# Patient Record
Sex: MALE | Race: WHITE | Employment: FULL TIME | URBAN - METROPOLITAN AREA
[De-identification: names, ages, dates, MRNs, and addresses within clinical notes are randomized per-mention and may not be internally consistent; named-entity substitution may affect disease eponyms.]

---

## 2017-04-05 RX ORDER — ASPIRIN 81 MG/1
81 TABLET ORAL DAILY
Status: ON HOLD | COMMUNITY
End: 2018-02-02

## 2017-04-06 ENCOUNTER — ANESTHESIA (OUTPATIENT)
Dept: ENDOSCOPY | Age: 72
End: 2017-04-06
Payer: COMMERCIAL

## 2017-04-06 ENCOUNTER — ANESTHESIA EVENT (OUTPATIENT)
Dept: ENDOSCOPY | Age: 72
End: 2017-04-06
Payer: COMMERCIAL

## 2017-04-06 ENCOUNTER — HOSPITAL ENCOUNTER (OUTPATIENT)
Age: 72
Setting detail: OUTPATIENT SURGERY
Discharge: HOME | End: 2017-04-06
Attending: SPECIALIST | Admitting: SPECIALIST
Payer: COMMERCIAL

## 2017-04-06 VITALS
SYSTOLIC BLOOD PRESSURE: 144 MMHG | RESPIRATION RATE: 19 BRPM | BODY MASS INDEX: 24.08 KG/M2 | DIASTOLIC BLOOD PRESSURE: 79 MMHG | OXYGEN SATURATION: 99 % | WEIGHT: 172 LBS | TEMPERATURE: 97.6 F | HEIGHT: 71 IN | HEART RATE: 63 BPM

## 2017-04-06 PROCEDURE — 76060000031 HC ANESTHESIA FIRST 0.5 HR: Performed by: SPECIALIST

## 2017-04-06 PROCEDURE — 74011250636 HC RX REV CODE- 250/636: Performed by: SPECIALIST

## 2017-04-06 PROCEDURE — 74011000250 HC RX REV CODE- 250

## 2017-04-06 PROCEDURE — 88305 TISSUE EXAM BY PATHOLOGIST: CPT | Performed by: SPECIALIST

## 2017-04-06 PROCEDURE — 77030019988 HC FCPS ENDOSC DISP BSC -B: Performed by: SPECIALIST

## 2017-04-06 PROCEDURE — 74011250636 HC RX REV CODE- 250/636

## 2017-04-06 PROCEDURE — 76040000019: Performed by: SPECIALIST

## 2017-04-06 RX ORDER — ATROPINE SULFATE 0.1 MG/ML
0.5 INJECTION INTRAVENOUS
Status: DISCONTINUED | OUTPATIENT
Start: 2017-04-06 | End: 2017-04-06 | Stop reason: HOSPADM

## 2017-04-06 RX ORDER — LIDOCAINE HYDROCHLORIDE 20 MG/ML
INJECTION, SOLUTION EPIDURAL; INFILTRATION; INTRACAUDAL; PERINEURAL AS NEEDED
Status: DISCONTINUED | OUTPATIENT
Start: 2017-04-06 | End: 2017-04-06 | Stop reason: HOSPADM

## 2017-04-06 RX ORDER — NALOXONE HYDROCHLORIDE 0.4 MG/ML
0.4 INJECTION, SOLUTION INTRAMUSCULAR; INTRAVENOUS; SUBCUTANEOUS
Status: DISCONTINUED | OUTPATIENT
Start: 2017-04-06 | End: 2017-04-06 | Stop reason: HOSPADM

## 2017-04-06 RX ORDER — SODIUM CHLORIDE 0.9 % (FLUSH) 0.9 %
5-10 SYRINGE (ML) INJECTION EVERY 8 HOURS
Status: CANCELLED | OUTPATIENT
Start: 2017-04-06 | End: 2017-04-06

## 2017-04-06 RX ORDER — EPINEPHRINE 0.1 MG/ML
1 INJECTION INTRACARDIAC; INTRAVENOUS
Status: DISCONTINUED | OUTPATIENT
Start: 2017-04-06 | End: 2017-04-06 | Stop reason: HOSPADM

## 2017-04-06 RX ORDER — SODIUM CHLORIDE 9 MG/ML
75 INJECTION, SOLUTION INTRAVENOUS CONTINUOUS
Status: DISCONTINUED | OUTPATIENT
Start: 2017-04-06 | End: 2017-04-06 | Stop reason: HOSPADM

## 2017-04-06 RX ORDER — MIDAZOLAM HYDROCHLORIDE 1 MG/ML
.25-5 INJECTION, SOLUTION INTRAMUSCULAR; INTRAVENOUS
Status: DISCONTINUED | OUTPATIENT
Start: 2017-04-06 | End: 2017-04-06 | Stop reason: HOSPADM

## 2017-04-06 RX ORDER — FLUMAZENIL 0.1 MG/ML
0.2 INJECTION INTRAVENOUS
Status: DISCONTINUED | OUTPATIENT
Start: 2017-04-06 | End: 2017-04-06 | Stop reason: HOSPADM

## 2017-04-06 RX ORDER — GLYCOPYRROLATE 0.2 MG/ML
INJECTION INTRAMUSCULAR; INTRAVENOUS AS NEEDED
Status: DISCONTINUED | OUTPATIENT
Start: 2017-04-06 | End: 2017-04-06 | Stop reason: HOSPADM

## 2017-04-06 RX ORDER — DEXTROMETHORPHAN/PSEUDOEPHED 2.5-7.5/.8
1.2 DROPS ORAL
Status: DISCONTINUED | OUTPATIENT
Start: 2017-04-06 | End: 2017-04-06 | Stop reason: HOSPADM

## 2017-04-06 RX ORDER — PROPOFOL 10 MG/ML
INJECTION, EMULSION INTRAVENOUS AS NEEDED
Status: DISCONTINUED | OUTPATIENT
Start: 2017-04-06 | End: 2017-04-06 | Stop reason: HOSPADM

## 2017-04-06 RX ORDER — SODIUM CHLORIDE 0.9 % (FLUSH) 0.9 %
5-10 SYRINGE (ML) INJECTION AS NEEDED
Status: CANCELLED | OUTPATIENT
Start: 2017-04-06 | End: 2017-04-06

## 2017-04-06 RX ORDER — SODIUM CHLORIDE 0.9 % (FLUSH) 0.9 %
5-10 SYRINGE (ML) INJECTION AS NEEDED
Status: DISCONTINUED | OUTPATIENT
Start: 2017-04-06 | End: 2017-04-06 | Stop reason: HOSPADM

## 2017-04-06 RX ORDER — SODIUM CHLORIDE 0.9 % (FLUSH) 0.9 %
5-10 SYRINGE (ML) INJECTION EVERY 8 HOURS
Status: DISCONTINUED | OUTPATIENT
Start: 2017-04-06 | End: 2017-04-06 | Stop reason: HOSPADM

## 2017-04-06 RX ADMIN — SODIUM CHLORIDE 75 ML/HR: 900 INJECTION, SOLUTION INTRAVENOUS at 08:13

## 2017-04-06 RX ADMIN — LIDOCAINE HYDROCHLORIDE 80 MG: 20 INJECTION, SOLUTION EPIDURAL; INFILTRATION; INTRACAUDAL; PERINEURAL at 08:37

## 2017-04-06 RX ADMIN — PROPOFOL 240 MG: 10 INJECTION, EMULSION INTRAVENOUS at 08:51

## 2017-04-06 RX ADMIN — GLYCOPYRROLATE 0.2 MG: 0.2 INJECTION INTRAMUSCULAR; INTRAVENOUS at 08:37

## 2017-04-06 NOTE — ROUTINE PROCESS
Joshua Appiah   1945  [id number]    Situation:  Verbal report received from: Sapphire Engle RN  Procedure: Procedure(s):  ESOPHAGOGASTRODUODENOSCOPY (EGD)  ESOPHAGOGASTRODUODENAL (EGD) BIOPSY    Background:    Preoperative diagnosis: [facility]  Postoperative diagnosis: inlet patch, barretts esophagus, hiatal hernia    :  Dr. Eladio Armstrong  Assistant(s): Endoscopy RN-1: Mendel Brown, RN  Endoscopy RN-2: Berneda Hatchet, RN    Specimens:   ID Type Source Tests Collected by Time Destination   1 : 39 cm distal esophagus biopsy Preservative Esophagus, Distal  Mark Anthony Lozano MD 4/6/2017 0845 Pathology   2 : 37 cm distal esophagus biopsy Preservative Esophagus, Distal  Mark Anthony Lozano MD 4/6/2017 5259 Pathology   3 : 35 cm distal esophagus biopsy Preservative Esophagus, Alma Lozano MD 4/6/2017 0848 Pathology   4 : 33 cm distal esophagus biopsy Preservative Esophagus, Alma Lozano MD 4/6/2017 3095 Pathology     H. Pylori  no    Assessment:  Intra-procedure medications   Anesthesia gave intra-procedure sedation and medications, see anesthesia flow sheet yes    Intravenous fluids: NS@ KVO     Vital signs stable     Abdominal assessment: round and soft     Recommendation:  Discharge patient per MD order.   Family   Permission to share finding with family or friend yes

## 2017-04-06 NOTE — ANESTHESIA PREPROCEDURE EVALUATION
Anesthetic History   No history of anesthetic complications            Review of Systems / Medical History  Patient summary reviewed, nursing notes reviewed and pertinent labs reviewed    Pulmonary  Within defined limits                 Neuro/Psych   Within defined limits           Cardiovascular                  Exercise tolerance: >4 METS  Comments:   EKG: SB, rate 59    Active, denies any chest pain or hrt probs   GI/Hepatic/Renal     GERD          Comments: Swan's , s/p HALO procedure Endo/Other  Within defined limits           Other Findings   Comments: Lyme's dis          Physical Exam    Airway  Mallampati: III  TM Distance: 4 - 6 cm  Neck ROM: normal range of motion   Mouth opening: Normal     Cardiovascular    Rhythm: regular  Rate: normal         Dental    Dentition: Lower partial plate  Comments: Missing some teeth, left lower partial at home, denies any loose teeth   Pulmonary  Breath sounds clear to auscultation               Abdominal  GI exam deferred       Other Findings            Anesthetic Plan    ASA: 2  Anesthesia type: total IV anesthesia          Induction: Intravenous  Anesthetic plan and risks discussed with: Patient

## 2017-04-06 NOTE — DISCHARGE INSTRUCTIONS
Diana Toledo   [id number]  1945              Procedure  Discharge Instructions:      Discomfort:  Redness at IV site- apply warm compress to area; if redness or soreness persist- contact your physician  There may be a slight amount of blood passed from the rectum  Gaseous discomfort- walking, belching will help relieve any discomfort  You may not operate a vehicle for 12 hours  You may not engage in an occupation involving machinery or appliances for rest of today  You may not drink alcoholic beverages for at least 12 hours  Avoid making any critical decisions for at least 24 hour  DIET:   You may resume your normal diet today. You should not overeat or \"feast\" today as your abdomen may become distended or uncomfortable. MEDICATIONS:   I reconciled this list from the list you gave us when you came today for the procedure. Please clarify with me, your primary care physician and the nurse who is discharging you if we have any discrepancies. Aspirin and or non-steroidal medication (Ibuprofen, Motrin, naproxen, etc.) is ok in limited quantities. ACTIVITY:  You may resume your normal daily activities it is recommended that you spend the remainder of the day resting -  avoid any strenuous activity. CALL M.D. ANY SIGN OF:  Increasing pain, nausea, vomiting  Abdominal distension (swelling)  New increased bleeding (oral or rectal)  Fever (chills)  Pain in chest area  Bloody discharge from nose or mouth  Shortness of breath          Follow-up Instructions:   Call Dr. Fran Reese for the results of  biopsy in approximately one week  Telephone #  [phone]  Follow up visit as needed. I will send you back to Dr. Nora Mae for more ablation with your permission. Braulio Wilson MD  8:56 AM  4/6/2017   [employer] Activation    Thank you for requesting access to [employer]. Please follow the instructions below to securely access and download your online medical record.  [employer] allows you to send messages to your doctor, view your test results, renew your prescriptions, schedule appointments, and more. How Do I Sign Up? 1. In your internet browser, go to [email or web address]  2. Click on the First Time User? Click Here link in the Sign In box. You will be redirect to the New Member Sign Up page. 3. Enter your Oktopost Access Code exactly as it appears below. You will not need to use this code after youve completed the sign-up process. If you do not sign up before the expiration date, you must request a new code. MyChart Access Code: Activation code not generated  Current Oktopost Status: Active (This is the date your Oktopost access code will )    4. Enter the last four digits of your Social Security Number (xxxx) and Date of Birth (mm/dd/yyyy) as indicated and click Submit. You will be taken to the next sign-up page. 5. Create a Oktopost ID. This will be your Oktopost login ID and cannot be changed, so think of one that is secure and easy to remember. 6. Create a Oktopost password. You can change your password at any time. 7. Enter your Password Reset Question and Answer. This can be used at a later time if you forget your password. 8. Enter your e-mail address. You will receive e-mail notification when new information is available in [address] 9. Click Sign Up. You can now view and download portions of your medical record. 10. Click the Download Summary menu link to download a portable copy of your medical information. Additional Information    If you have questions, please visit the Frequently Asked Questions section of the Oktopost website at [email or web address] Remember, Oktopost is NOT to be used for urgent needs. For medical emergencies, dial 911.

## 2017-04-06 NOTE — PERIOP NOTES
Anesthesia reports 240mg Propofol, 80mg Lidocaine and 650mL NS, 0.2 mg robinul given during procedure. Received report from anesthesia staff on vital signs and status of patient.

## 2017-04-06 NOTE — PROCEDURES
Esophagogastroduodenoscopy    Indications:  History of Swan's esophagus with low grade dysplasia, s/p ablation    Medications:  See anesthesia form    Post procedure diagnosis:  inlet patch, barretts esophagus, hiatal hernia    Description of Procedure:    Prior to the procedure its objectives, risks, consequences and alternatives were discussed with the patient who then elected to proceed. The Olympus video endoscope was inserted under direct vision into the mouth and then into the esophagus. An inlet patch was present just below the crico.  There was normal esophageal mucosa to 33 cm where two wide tongues of salmon colored mucosa began. These tongues extended distally to the gastric folds at 39 cm. There was no circumferential Swan's. Narrow band imaging was used and photos were taken. There was a two cm hiatal hernia with the diaphragm at 41 cm. There were no diagnostic abnormalities of the body, fundus, antrum, cardia and incisura of the stomach. This included direct and retroflexion examination. The first and second portion of the duodenum appeared normal.  I took biopsies of the Swan's at 2 cm intervals. Complications: There were no apparent complications and the patient tolerated the procedure well. Estimated Blood Loss:  none  Specimens Removed:  Esophagus at 39 cm  Esophagus at 37 cm  Esophagus at 35 cm  Esophagus at 33 cm    Impressions:  Tongues of Swan's esophagus from 33 to 39 cm.     2 cm hiatal hernia  Inlet patch      Signed By: Jaskaran Barlow MD                        April 6, 2017     8:52 AM

## 2017-04-06 NOTE — IP AVS SNAPSHOT
[address] [place] [address] 
[phone] Patient: Linda Means 
MRN: [id number] YEB:4/11/2886 You are allergic to the following Allergen Reactions Adhesive Tape-Silicones Other (comments) Some bandaids \"breaks skin out\" per wife. Recent Documentation Height Weight BMI Smoking Status 1.791 m 78 kg 24.33 kg/m2 Never Smoker Emergency Contacts Name Discharge Info Relation Home Work Mobile Marysol Strickland  Spouse [3]   [phone] About your hospitalization You were admitted on:  April 6, 2017 You last received care in the:  [facility] You were discharged on:  April 6, 2017 Unit phone number:  [phone] Why you were hospitalized Your primary diagnosis was:  Not on File Your diagnoses also included:  Swan's Esophagus Providers Seen During Your Hospitalizations Provider Role Specialty Primary office phone Soumya Reddy MD Attending Provider Gastroenterology [phone] Your Primary Care Physician (PCP) Primary Care Physician Office Phone Office Fax Kareem Burrows [phone] Follow-up Information Follow up With Details Comments Contact Info [place]omi [place]   [address] [address] 
[phone] Current Discharge Medication List  
  
CONTINUE these medications which have NOT CHANGED Dose & Instructions Dispensing Information Comments Morning Noon Evening Bedtime  
 aspirin delayed-release 81 mg tablet Your last dose was: Your next dose is:    
   
   
 Dose:  81 mg Take 81 mg by mouth daily. Refills:  0  
     
   
   
   
  
 omeprazole 20 mg capsule Commonly known as:  PRILOSEC Your last dose was: Your next dose is:    
   
   
 Dose:  40 mg Take 2 Caps by mouth two (2) times a day. Before meals Quantity:  60 Cap Refills:  11 Discharge Instructions John Campos  
[id number] 
1945 Procedure  Discharge Instructions:   
 
Discomfort: 
Redness at IV site- apply warm compress to area; if redness or soreness persist- contact your physician There may be a slight amount of blood passed from the rectum Gaseous discomfort- walking, belching will help relieve any discomfort You may not operate a vehicle for 12 hours You may not engage in an occupation involving machinery or appliances for rest of today You may not drink alcoholic beverages for at least 12 hours Avoid making any critical decisions for at least 24 hour DIET: 
 You may resume your normal diet today. You should not overeat or \"feast\" today as your abdomen may become distended or uncomfortable. MEDICATIONS: 
 I reconciled this list from the list you gave us when you came today for the procedure. Please clarify with me, your primary care physician and the nurse who is discharging you if we have any discrepancies. Aspirin and or non-steroidal medication (Ibuprofen, Motrin, naproxen, etc.) is ok in limited quantities. ACTIVITY: 
You may resume your normal daily activities it is recommended that you spend the remainder of the day resting -  avoid any strenuous activity. CALL M.D. ANY SIGN OF: Increasing pain, nausea, vomiting Abdominal distension (swelling) New increased bleeding (oral or rectal) Fever (chills) Pain in chest area Bloody discharge from nose or mouth Shortness of breath Follow-up Instructions: 
 Call Dr. Grover Burgos for the results of  biopsy in approximately one week Telephone #  [phone] Follow up visit as needed. I will send you back to Dr. Natasha Pena for more ablation with your permission. Yash Hernandez MD 
8:56 AM 
4/6/2017 [email or web address] Activation Thank you for requesting access to [employer].  Please follow the instructions below to securely access and download your online medical record. [employer] corporation allows you to send messages to your doctor, view your test results, renew your prescriptions, schedule appointments, and more. How Do I Sign Up? 1. In your internet browser, go to [email or web address] 
2. Click on the First Time User? Click Here link in the Sign In box. You will be redirect to the New Member Sign Up page. 3. Enter your [employer] corporation Access Code exactly as it appears below. You will not need to use this code after youve completed the sign-up process. If you do not sign up before the expiration date, you must request a new code. [employer] Access Code: Activation code not generated Current [employer] Status: Active (This is the date your [employer] access code will ) 4. Enter the last four digits of your Social Security Number (xxxx) and Date of Birth (mm/dd/yyyy) as indicated and click Submit. You will be taken to the next sign-up page. 5. Create a [employer] ID. This will be your [employer] login ID and cannot be changed, so think of one that is secure and easy to remember. 6. Create a [employer] password. You can change your password at any time. 7. Enter your Password Reset Question and Answer. This can be used at a later time if you forget your password. 8. Enter your e-mail address. You will receive e-mail notification when new information is available in [address] 9. Click Sign Up. You can now view and download portions of your medical record. 10. Click the Download Summary menu link to download a portable copy of your medical information. Additional Information If you have questions, please visit the Frequently Asked Questions section of the [employer] corporation website at [email or web address] Remember, [employer] corporation is NOT to be used for urgent needs. For medical emergencies, dial 911. Discharge Orders None Introducing [facility] SERVICES! Dear Agustina Ravi: Thank you for requesting a Sweetgreen account. Our records indicate that you already have an active Sweetgreen account. You can access your account anytime at [email or web address] Did you know that you can access your hospital and ER discharge instructions at any time in Sweetgreen? You can also review all of your test results from your hospital stay or ER visit. Additional Information If you have questions, please visit the Frequently Asked Questions section of the Sweetgreen website at [email or web address]/. Remember, Sweetgreen is NOT to be used for urgent needs. For medical emergencies, dial 911. Now available from your iPhone and Android! General Information Please provide this summary of care documentation to your next provider. Patient Signature:  ____________________________________________________________ Date:  ____________________________________________________________  
  
Michell Wheatland Provider Signature:  ____________________________________________________________ Date:  ____________________________________________________________

## 2017-04-06 NOTE — ANESTHESIA POSTPROCEDURE EVALUATION
Post-Anesthesia Evaluation and Assessment    Patient: Tigre Washington MRN: [id number]  SSN: [id number]    YOB: 1945  Age: 66 y.o. Sex: male       Cardiovascular Function/Vital Signs  Visit Vitals    /79    Pulse 63    Temp 36.4 °C (97.6 °F)    Resp 19    Ht 5' 10.5\" (1.791 m)    Wt 78 kg (172 lb)    SpO2 99%    BMI 24.33 kg/m2       Patient is status post total IV anesthesia anesthesia for Procedure(s):  ESOPHAGOGASTRODUODENOSCOPY (EGD)  ESOPHAGOGASTRODUODENAL (EGD) BIOPSY. Nausea/Vomiting: None    Postoperative hydration reviewed and adequate. Pain:  Pain Scale 1: Numeric (0 - 10) (04/06/17 5888)  Pain Intensity 1: 0 (04/06/17 2552)   Managed    Neurological Status: At baseline    Mental Status and Level of Consciousness: Arousable    Pulmonary Status:   O2 Device: Room air (04/06/17 9899)   Adequate oxygenation and airway patent    Complications related to anesthesia: None    Post-anesthesia assessment completed.  No concerns    Signed By: Nba Jeffery DO     April 6, 2017

## 2017-04-06 NOTE — H&P
Pre-endoscopy H and P    The patient was seen and examined in the endoscopy suite. The airway was assessed and docuemented. The problem list, past medical history, and medications were reviewed. Patient Active Problem List   Diagnosis Code    Swan's esophagus K22.70     Social History     Social History    Marital status:      Spouse name: N/A    Number of children: N/A    Years of education: N/A     Occupational History    Not on file. Social History Main Topics    Smoking status: Never Smoker    Smokeless tobacco: Never Used    Alcohol use No    Drug use: No    Sexual activity: Not on file     Other Topics Concern    Not on file     Social History Narrative     Past Medical History:   Diagnosis Date    Swan's esophagus     Gastrointestinal disorder     GERD (gastroesophageal reflux disease)     Ill-defined condition     lyme's disease     The patient has a family history of na    Prior to Admission Medications   Prescriptions Last Dose Informant Patient Reported? Taking?   aspirin delayed-release 81 mg tablet Not Taking at Unknown time  Yes No   Sig: Take 81 mg by mouth daily. omeprazole (PRILOSEC) 20 mg capsule 3/6/2017 at Unknown time  No Yes   Sig: Take 2 Caps by mouth two (2) times a day. Before meals      Facility-Administered Medications: None           The review of systems is:  negative for shortness of breath or chest pain      The heart, lungs, and mental status were satisfactory for the administration of anesthesia sedation and for the procedure. I discussed with the patient the objectives, risks, consequences and alternatives to the procedure.       Yash Hernandez MD  4/6/2017  8:36 AM

## 2017-07-24 ENCOUNTER — ANESTHESIA EVENT (OUTPATIENT)
Dept: ENDOSCOPY | Age: 72
End: 2017-07-24
Payer: MEDICARE

## 2017-07-24 ENCOUNTER — HOSPITAL ENCOUNTER (OUTPATIENT)
Age: 72
Setting detail: OUTPATIENT SURGERY
Discharge: HOME | End: 2017-07-24
Attending: INTERNAL MEDICINE | Admitting: INTERNAL MEDICINE
Payer: MEDICARE

## 2017-07-24 ENCOUNTER — ANESTHESIA (OUTPATIENT)
Dept: ENDOSCOPY | Age: 72
End: 2017-07-24
Payer: MEDICARE

## 2017-07-24 VITALS
DIASTOLIC BLOOD PRESSURE: 52 MMHG | HEART RATE: 56 BPM | SYSTOLIC BLOOD PRESSURE: 162 MMHG | OXYGEN SATURATION: 97 % | RESPIRATION RATE: 8 BRPM

## 2017-07-24 PROCEDURE — 76060000031 HC ANESTHESIA FIRST 0.5 HR: Performed by: INTERNAL MEDICINE

## 2017-07-24 PROCEDURE — 74011250636 HC RX REV CODE- 250/636

## 2017-07-24 PROCEDURE — 77030025937 HC CAP SEAL HOLO BRMX -B: Performed by: INTERNAL MEDICINE

## 2017-07-24 PROCEDURE — 76040000019: Performed by: INTERNAL MEDICINE

## 2017-07-24 PROCEDURE — 74011250636 HC RX REV CODE- 250/636: Performed by: INTERNAL MEDICINE

## 2017-07-24 PROCEDURE — 77030036721 HC CATH BLN ABLAT ESOPH BARRX COVD -H1: Performed by: INTERNAL MEDICINE

## 2017-07-24 PROCEDURE — 74011000250 HC RX REV CODE- 250

## 2017-07-24 RX ORDER — FLUMAZENIL 0.1 MG/ML
0.2 INJECTION INTRAVENOUS
Status: CANCELLED | OUTPATIENT
Start: 2017-07-24 | End: 2017-07-24

## 2017-07-24 RX ORDER — NALOXONE HYDROCHLORIDE 0.4 MG/ML
0.4 INJECTION, SOLUTION INTRAMUSCULAR; INTRAVENOUS; SUBCUTANEOUS
Status: CANCELLED | OUTPATIENT
Start: 2017-07-24 | End: 2017-07-24

## 2017-07-24 RX ORDER — FENTANYL CITRATE 50 UG/ML
100 INJECTION, SOLUTION INTRAMUSCULAR; INTRAVENOUS
Status: CANCELLED | OUTPATIENT
Start: 2017-07-24

## 2017-07-24 RX ORDER — FINASTERIDE 5 MG/1
5 TABLET, FILM COATED ORAL DAILY
Status: ON HOLD | COMMUNITY
End: 2018-02-02

## 2017-07-24 RX ORDER — SODIUM CHLORIDE 9 MG/ML
INJECTION, SOLUTION INTRAVENOUS
Status: DISCONTINUED | OUTPATIENT
Start: 2017-07-24 | End: 2017-07-24 | Stop reason: HOSPADM

## 2017-07-24 RX ORDER — ATROPINE SULFATE 0.1 MG/ML
0.5 INJECTION INTRAVENOUS
Status: CANCELLED | OUTPATIENT
Start: 2017-07-24 | End: 2017-07-24

## 2017-07-24 RX ORDER — EPINEPHRINE 0.1 MG/ML
1 INJECTION INTRACARDIAC; INTRAVENOUS
Status: CANCELLED | OUTPATIENT
Start: 2017-07-24 | End: 2017-07-24

## 2017-07-24 RX ORDER — MIDAZOLAM HYDROCHLORIDE 1 MG/ML
.25-1 INJECTION, SOLUTION INTRAMUSCULAR; INTRAVENOUS
Status: CANCELLED | OUTPATIENT
Start: 2017-07-24 | End: 2017-07-24

## 2017-07-24 RX ORDER — LIDOCAINE HYDROCHLORIDE 20 MG/ML
INJECTION, SOLUTION EPIDURAL; INFILTRATION; INTRACAUDAL; PERINEURAL AS NEEDED
Status: DISCONTINUED | OUTPATIENT
Start: 2017-07-24 | End: 2017-07-24 | Stop reason: HOSPADM

## 2017-07-24 RX ORDER — SODIUM CHLORIDE 0.9 % (FLUSH) 0.9 %
5-10 SYRINGE (ML) INJECTION AS NEEDED
Status: CANCELLED | OUTPATIENT
Start: 2017-07-24 | End: 2017-07-24

## 2017-07-24 RX ORDER — SODIUM CHLORIDE 9 MG/ML
25 INJECTION, SOLUTION INTRAVENOUS CONTINUOUS
Status: DISCONTINUED | OUTPATIENT
Start: 2017-07-24 | End: 2017-07-24 | Stop reason: HOSPADM

## 2017-07-24 RX ORDER — SODIUM CHLORIDE 0.9 % (FLUSH) 0.9 %
5-10 SYRINGE (ML) INJECTION EVERY 8 HOURS
Status: CANCELLED | OUTPATIENT
Start: 2017-07-24 | End: 2017-07-24

## 2017-07-24 RX ORDER — SODIUM CHLORIDE 9 MG/ML
50 INJECTION, SOLUTION INTRAVENOUS CONTINUOUS
Status: CANCELLED | OUTPATIENT
Start: 2017-07-24 | End: 2017-07-24

## 2017-07-24 RX ORDER — PROPOFOL 10 MG/ML
INJECTION, EMULSION INTRAVENOUS AS NEEDED
Status: DISCONTINUED | OUTPATIENT
Start: 2017-07-24 | End: 2017-07-24 | Stop reason: HOSPADM

## 2017-07-24 RX ORDER — DEXTROMETHORPHAN/PSEUDOEPHED 2.5-7.5/.8
1.2 DROPS ORAL
Status: CANCELLED | OUTPATIENT
Start: 2017-07-24

## 2017-07-24 RX ADMIN — PROPOFOL 30 MG: 10 INJECTION, EMULSION INTRAVENOUS at 12:53

## 2017-07-24 RX ADMIN — LIDOCAINE HYDROCHLORIDE 20 MG: 20 INJECTION, SOLUTION EPIDURAL; INFILTRATION; INTRACAUDAL; PERINEURAL at 12:33

## 2017-07-24 RX ADMIN — PROPOFOL 20 MG: 10 INJECTION, EMULSION INTRAVENOUS at 13:12

## 2017-07-24 RX ADMIN — LIDOCAINE HYDROCHLORIDE 20 MG: 20 INJECTION, SOLUTION EPIDURAL; INFILTRATION; INTRACAUDAL; PERINEURAL at 12:38

## 2017-07-24 RX ADMIN — LIDOCAINE HYDROCHLORIDE 20 MG: 20 INJECTION, SOLUTION EPIDURAL; INFILTRATION; INTRACAUDAL; PERINEURAL at 12:31

## 2017-07-24 RX ADMIN — SODIUM CHLORIDE: 9 INJECTION, SOLUTION INTRAVENOUS at 12:08

## 2017-07-24 RX ADMIN — SODIUM CHLORIDE 25 ML/HR: 900 INJECTION, SOLUTION INTRAVENOUS at 11:41

## 2017-07-24 RX ADMIN — PROPOFOL 20 MG: 10 INJECTION, EMULSION INTRAVENOUS at 12:52

## 2017-07-24 RX ADMIN — LIDOCAINE HYDROCHLORIDE 20 MG: 20 INJECTION, SOLUTION EPIDURAL; INFILTRATION; INTRACAUDAL; PERINEURAL at 12:37

## 2017-07-24 RX ADMIN — PROPOFOL 30 MG: 10 INJECTION, EMULSION INTRAVENOUS at 13:15

## 2017-07-24 RX ADMIN — PROPOFOL 10 MG: 10 INJECTION, EMULSION INTRAVENOUS at 13:08

## 2017-07-24 RX ADMIN — PROPOFOL 100 MG: 10 INJECTION, EMULSION INTRAVENOUS at 12:51

## 2017-07-24 RX ADMIN — PROPOFOL 20 MG: 10 INJECTION, EMULSION INTRAVENOUS at 13:10

## 2017-07-24 RX ADMIN — LIDOCAINE HYDROCHLORIDE 20 MG: 20 INJECTION, SOLUTION EPIDURAL; INFILTRATION; INTRACAUDAL; PERINEURAL at 12:35

## 2017-07-24 RX ADMIN — PROPOFOL 20 MG: 10 INJECTION, EMULSION INTRAVENOUS at 12:58

## 2017-07-24 RX ADMIN — PROPOFOL 20 MG: 10 INJECTION, EMULSION INTRAVENOUS at 13:04

## 2017-07-24 RX ADMIN — PROPOFOL 20 MG: 10 INJECTION, EMULSION INTRAVENOUS at 13:01

## 2017-07-24 RX ADMIN — PROPOFOL 20 MG: 10 INJECTION, EMULSION INTRAVENOUS at 13:06

## 2017-07-24 RX ADMIN — PROPOFOL 20 MG: 10 INJECTION, EMULSION INTRAVENOUS at 13:03

## 2017-07-24 RX ADMIN — PROPOFOL 20 MG: 10 INJECTION, EMULSION INTRAVENOUS at 12:56

## 2017-07-24 NOTE — ROUTINE PROCESS
Natalie Husbands   1945  [id number]    Situation:  Verbal report received from: Padma Pleitez  Procedure: Procedure(s):  EGD, HALO ABLATION  ESOPHAGOGASTRODUODENOSCOPY (EGD)    Background:    Preoperative diagnosis: BARRETTS  Postoperative diagnosis: Long Segment Swan's: 27cm-38cm, 6 Burns @ 10J  Hiatal Hernia    :  Dr. Myles Gee  Assistant(s): Endoscopy Technician-1: Mark Noble  Endoscopy RN-1: Vida Martel RN    Specimens: * No specimens in log *  H. Pylori  no    Assessment:  Intra-procedure medications   Anesthesia gave intra-procedure sedation and medications, see anesthesia flow sheet yes    Intravenous fluids: NS@ KVO     Vital signs stable yes    Abdominal assessment: round and soft  yes    Recommendation:  Discharge patient per MD order yes .   Return to floor na   Family or Friend fam  Permission to share finding with family or friend yes

## 2017-07-24 NOTE — H&P
118 [facility].  217 [facility] 140 [facility], [address]  [phone]                                History and Physical     NAME: Collette Bair Sr   :  1945   MRN:  [id number]     HPI:  The patient was seen and examined. Past Surgical History:   Procedure Laterality Date    HX HERNIA REPAIR  \"when I was 13\"     Past Medical History:   Diagnosis Date    Swan's esophagus     Gastrointestinal disorder     GERD (gastroesophageal reflux disease)     Ill-defined condition     lyme's disease     Social History   Substance Use Topics    Smoking status: Never Smoker    Smokeless tobacco: Never Used    Alcohol use No     Allergies   Allergen Reactions    Adhesive Tape-Silicones Other (comments)     Some bandaids \"breaks skin out\" per wife. Family History   Problem Relation Age of Onset    Cancer Father      leukemia     Current Facility-Administered Medications   Medication Dose Route Frequency    0.9% sodium chloride infusion  25 mL/hr IntraVENous CONTINUOUS     Facility-Administered Medications Ordered in Other Encounters   Medication Dose Route Frequency    0.9% sodium chloride infusion   IntraVENous CONTINUOUS    lidocaine (PF) (XYLOCAINE) 20 mg/mL (2 %) injection   IntraVENous PRN         PHYSICAL EXAM:  General: WD, WN. Alert, cooperative, no acute distress    HEENT: NC, Atraumatic. PERRLA, EOMI. Anicteric sclerae. Lungs:  CTA Bilaterally. No Wheezing/Rhonchi/Rales. Heart:  Regular  rhythm,  No murmur, No Rubs, No Gallops  Abdomen: Soft, Non distended, Non tender.  +Bowel sounds, no HSM  Extremities: No c/c/e  Neurologic:  CN 2-12 gi, Alert and oriented X 3. No acute neurological distress   Psych:   Good insight. Not anxious nor agitated. The heart, lungs and mental status were satisfactory for the administration of MAC sedation and for the procedure.       Mallampati score: 3       Assessment:   · Swan's esophagus    Plan:   · Endoscopic procedure  · MAC sedation   ·

## 2017-07-24 NOTE — PROCEDURES
118 [facility].  217 [facility] 140 [address], [address]   [phone]                            NAME:  Romelia Mims Sr   :   1945   MRN:   [id number]     Date/Time:  2017 1:20 PM    Endoscopy with Circumferential Ablation of Esophageal Tissue Procedure Note    :  Jolene Sim MD    Referring Provider:  Sergey Galvan MD    Anethesia/Sedation:  MAC anesthesia    Baseline Patient History:  · Length of IM: 11 cm  · Presence of Dysplasia YES  · GERD Symptoms NO  · Anti-Secretory Therapy Yes  Pre-Op Diagnosis: Swan's esophagus, with low grade dysplasia  Post-Op Diagnosis: Swan's esophagus, hiatal hernia  Summary of Findings:   Esophagus: [place] colored islands were seen starting at 27 cm and extended to 38 cm (top of gastric folds). No nodule, ulceration or granularity was seen. NBI was used. Medium sliding hiatal hernia was seen  Stomach: Normal  Duodenum: Normal    Procedure Description:  The patient was positioned in the left lateral decubitus position and vital sign monitoring equipment connected in the usual manner. Intravenous sedation was administered. (Esophagoscopy or EGD) was performed with identification of the top of the intestinal metaplasia and the top of the gastric folds. The distance from the bite block to each of these anatomic landmarks was recorded, and the total length of intestinal metaplasia calculated from these measurements. These landmarks were located as follows:    Top of intestinal metaplasia: 27 cm  Top of the gastric folds: 38 cm    NBI was used. The Swan's esophagus tissue was irrigated with water. A guidewire was placed and the endoscope removed. A Barrx 360 Express RFA Balloon Catheter was introduced over the guidewire. The endoscope was introduced in a [id number] manner with the Balloon Catheter.  The balloon electrode was positioned under direct visualization so that the proximal edge of the electrode was slightly above the top of the intestinal metaplasia. The balloon was automatically inflated and energy applied at 230 W and 10 J/cm2. The displayed inner diameter measurement was recorded. The electrode was moved distally by 4cm. aligning the proximal edge of the electrode with the distal edge of the ablation zone. Inflation and ablation was repeated until the top of the gastric folds was reached. The Ablation Catheter was removed, cleaned and reintroduced. The ablation zone was cleaned of coagulative debris with irrigation and suction using the endoscope and a cleaning cap. The Ablation Catheter was positioned under direct visualization so that the proximal edge of the electrode was at the proximal edge of the ablation zone. Inflation, ablation, and repositioning were repeated as in the first set of treatments. The Ablation Catheter and guidewire were removed. (Esophagoscopy or EGD) confirmed complete ablation of all intestinal metaplasia. Follow Up:   -Follow-up in 2-3 months for upper endoscopy with HALO ablation in the setting of visible disease, op upper endoscopy with biopsy if no residual disease is seen.   -Continue acid blockers   -Follow post HALO ablation guidelines.

## 2017-07-24 NOTE — DISCHARGE INSTRUCTIONS
[address]  [address] 14[address] [facility]  [phone]                     Discharge Instructions after Ablation of Swan's Esophagus    You have undergone an upper Endoscopy with ablation of Swan's esophagus. You may experience one or more of the following symptoms after treatment: chest discomfort, sore throat, difficulty or pain when swallowing and /or nausea/vomiting. These symptoms should improve with each day. You will be provided with several medications and specific instructions (below) to make you as comfortable as possible during this time. Should any of your symptoms be more severe in nature or longer in duration then we have described, please contact your physician. It is important to continue a strict and long-term regimen of anti-secretory medication after this treatment, such as Nexium, Prevacid, or other similar medication. ·  Maximize anti-secretory regimen, per MD  Please DO NOT stop taking this   medicine. If you are unable to swallow the pill you may crush it if allowed   or contact your physician for a liquid form. ·  Antacid/lidocaine mixture by mouth as needed, per MD    ·  Liquid acetaminophen (Tylenol) with or without codeine by mouth as needed, per  MD    ·  Anti-emetic (anti-nausea) medication per rectum as needed, per MD    ·  Full liquid diet for 24 hours, and then advance to soft diet for 1 week. Avoid   extreme cold or hot beverage and food. Avoid aspirin or non-steroidal anti-inflammatory medications (motrin, advil, and   Ibuprofen)    ·  Contact your treating physician immediately for significant chest pain, difficulty   swallowing, fever, bleeding, abdominal pain, difficulty breathing, vomiting or   other warning signs provided by the physician, so that the physician may    complete the appropriate diagnostic work-up and/or interventions as needed to   avoid complications.     ·  If you seek care for a digestive issue from any healthcare personnel in the   next 6 months following this procedure, other than the treating physician,   the treating physician should be consulted before any treatment is    Initiated.

## 2017-07-24 NOTE — ANESTHESIA POSTPROCEDURE EVALUATION
Post-Anesthesia Evaluation and Assessment    Patient: Steve Peter MRN: [id number]  SSN: [id number]    YOB: 1945  Age: 67 y.o. Sex: male       Cardiovascular Function/Vital Signs  Visit Vitals    BP (!) 132/91    Pulse (!) 55    Resp 14    SpO2 100%       Patient is status post MAC anesthesia for Procedure(s):  EGD, HALO ABLATION  ESOPHAGOGASTRODUODENOSCOPY (EGD). Nausea/Vomiting: None    Postoperative hydration reviewed and adequate. Pain:  Pain Scale 1: Numeric (0 - 10) (07/24/17 1325)  Pain Intensity 1: 0 (07/24/17 1325)   Managed    Neurological Status: At baseline    Mental Status and Level of Consciousness: Arousable    Pulmonary Status:   O2 Device: Nasal cannula (07/24/17 1316)   Adequate oxygenation and airway patent    Complications related to anesthesia: None    Post-anesthesia assessment completed.  No concerns    Signed By: Bobo Greer MD     July 24, 2017

## 2017-07-24 NOTE — IP AVS SNAPSHOT
[address] 
[phone] Patient: Renzo Puri 
MRN: [id number] YHX:[id number] You are allergic to the following Allergen Reactions Adhesive Tape-Silicones Other (comments) Some bandaids \"breaks skin out\" per wife. Recent Documentation Smoking Status Never Smoker Emergency Contacts Name Discharge Info Relation Home Work Mobile Marysol Strickland  Spouse [3]   [phone] About your hospitalization You were admitted on:  July 24, 2017 You last received care in the:  [address] [place] [address] ENDOSCOPY You were discharged on:  July 24, 2017 Unit phone number:  [phone] Why you were hospitalized Your primary diagnosis was:  Not on File Providers Seen During Your Hospitalizations Provider Role Specialty Primary office phone Brianna Maza MD Attending Provider Gastroenterology [phone] Your Primary Care Physician (PCP) Primary Care Physician Office Phone Office Fax Eduardoroseann Erica [phone] Follow-up Information None Current Discharge Medication List  
  
CONTINUE these medications which have NOT CHANGED Dose & Instructions Dispensing Information Comments Morning Noon Evening Bedtime  
 aspirin delayed-release 81 mg tablet Your last dose was: Your next dose is:    
   
   
 Dose:  81 mg Take 81 mg by mouth daily. Refills:  0  
     
   
   
   
  
 finasteride 5 mg tablet Commonly known as:  PROSCAR Your last dose was: Your next dose is:    
   
   
 Dose:  5 mg Take 5 mg by mouth daily. Refills:  0  
     
   
   
   
  
 omeprazole 20 mg capsule Commonly known as:  PRILOSEC Your last dose was: Your next dose is:    
   
   
 Dose:  40 mg Take 2 Caps by mouth two (2) times a day. Before meals Quantity:  60 Cap Refills:  11 Discharge Instructions Dayna Goodrich 272 
217 [facility] [address], [address] 
[phone] Discharge Instructions after Ablation of Swan's Esophagus You have undergone an upper Endoscopy with ablation of Swan's esophagus. You may experience one or more of the following symptoms after treatment: chest discomfort, sore throat, difficulty or pain when swallowing and /or nausea/vomiting. These symptoms should improve with each day. You will be provided with several medications and specific instructions (below) to make you as comfortable as possible during this time. Should any of your symptoms be more severe in nature or longer in duration then we have described, please contact your physician. It is important to continue a strict and long-term regimen of anti-secretory medication after this treatment, such as Nexium, Prevacid, or other similar medication. ·  Maximize anti-secretory regimen, per MD  Please DO NOT stop taking this   medicine. If you are unable to swallow the pill you may crush it if allowed   or contact your physician for a liquid form. ·  Antacid/lidocaine mixture by mouth as needed, per MD 
 
·  Liquid acetaminophen (Tylenol) with or without codeine by mouth as needed, per  MD 
 
·  Anti-emetic (anti-nausea) medication per rectum as needed, per MD 
 
·  Full liquid diet for 24 hours, and then advance to soft diet for 1 week. Avoid   extreme cold or hot beverage and food. Avoid aspirin or non-steroidal anti-inflammatory medications (motrin, advil, and   Ibuprofen) ·  Contact your treating physician immediately for significant chest pain, difficulty   swallowing, fever, bleeding, abdominal pain, difficulty breathing, vomiting or   other warning signs provided by the physician, so that the physician may    complete the appropriate diagnostic work-up and/or interventions as needed to   avoid complications. ·  If you seek care for a digestive issue from any healthcare personnel in the   next 6 months following this procedure, other than the treating physician,   the treating physician should be consulted before any treatment is    Initiated. Discharge Orders None [facility]! Dear Josi Armadno: Thank you for requesting a [employer] account. Our records indicate that you already have an active [employer] account. You can access your account anytime at [email or web address] Did you know that you can access your hospital and ER discharge instructions at any time in [employer]? You can also review all of your test results from your hospital stay or ER visit. Additional Information If you have questions, please visit the Frequently Asked Questions section of the [employer] website at [email or web address] Remember, [employer] is NOT to be used for urgent needs. For medical emergencies, dial 911. Now available from your iPhone and Android! General Information Please provide this summary of care documentation to your next provider. Patient Signature:  ____________________________________________________________ Date:  ____________________________________________________________  
  
[facility] Provider Signature:  ____________________________________________________________ Date:  ____________________________________________________________

## 2017-07-24 NOTE — PERIOP NOTES

## 2017-07-24 NOTE — ANESTHESIA PREPROCEDURE EVALUATION
Anesthetic History   No history of anesthetic complications            Review of Systems / Medical History  Patient summary reviewed, nursing notes reviewed and pertinent labs reviewed    Pulmonary  Within defined limits                 Neuro/Psych   Within defined limits           Cardiovascular  Within defined limits                     GI/Hepatic/Renal  Within defined limits   GERD           Endo/Other  Within defined limits           Other Findings              Physical Exam    Airway  Mallampati: II  TM Distance: > 6 cm  Neck ROM: normal range of motion   Mouth opening: Normal     Cardiovascular  Regular rate and rhythm,  S1 and S2 normal,  no murmur, click, rub, or gallop             Dental  No notable dental hx       Pulmonary  Breath sounds clear to auscultation               Abdominal  GI exam deferred       Other Findings            Anesthetic Plan    ASA: 2  Anesthesia type: MAC          Induction: Intravenous  Anesthetic plan and risks discussed with: Patient

## 2017-10-25 ENCOUNTER — ANESTHESIA EVENT (OUTPATIENT)
Dept: ENDOSCOPY | Age: 72
End: 2017-10-25
Payer: MEDICARE

## 2017-10-25 ENCOUNTER — ANESTHESIA (OUTPATIENT)
Dept: ENDOSCOPY | Age: 72
End: 2017-10-25
Payer: MEDICARE

## 2017-10-25 ENCOUNTER — HOSPITAL ENCOUNTER (OUTPATIENT)
Age: 72
Setting detail: OUTPATIENT SURGERY
Discharge: HOME | End: 2017-10-25
Attending: INTERNAL MEDICINE | Admitting: INTERNAL MEDICINE
Payer: MEDICARE

## 2017-10-25 VITALS
SYSTOLIC BLOOD PRESSURE: 145 MMHG | BODY MASS INDEX: 23.94 KG/M2 | HEART RATE: 57 BPM | WEIGHT: 167.2 LBS | RESPIRATION RATE: 10 BRPM | DIASTOLIC BLOOD PRESSURE: 83 MMHG | OXYGEN SATURATION: 100 % | TEMPERATURE: 96.9 F | HEIGHT: 70 IN

## 2017-10-25 PROCEDURE — C1886 CATHETER, ABLATION: HCPCS | Performed by: INTERNAL MEDICINE

## 2017-10-25 PROCEDURE — 76040000019: Performed by: INTERNAL MEDICINE

## 2017-10-25 PROCEDURE — 76060000031 HC ANESTHESIA FIRST 0.5 HR: Performed by: INTERNAL MEDICINE

## 2017-10-25 PROCEDURE — 74011250636 HC RX REV CODE- 250/636

## 2017-10-25 PROCEDURE — 74011000250 HC RX REV CODE- 250

## 2017-10-25 RX ORDER — SODIUM CHLORIDE 0.9 % (FLUSH) 0.9 %
5-10 SYRINGE (ML) INJECTION EVERY 8 HOURS
Status: DISCONTINUED | OUTPATIENT
Start: 2017-10-25 | End: 2017-10-25 | Stop reason: HOSPADM

## 2017-10-25 RX ORDER — DEXTROMETHORPHAN/PSEUDOEPHED 2.5-7.5/.8
1.2 DROPS ORAL
Status: DISCONTINUED | OUTPATIENT
Start: 2017-10-25 | End: 2017-10-25 | Stop reason: HOSPADM

## 2017-10-25 RX ORDER — SODIUM CHLORIDE 9 MG/ML
INJECTION, SOLUTION INTRAVENOUS
Status: DISCONTINUED | OUTPATIENT
Start: 2017-10-25 | End: 2017-10-25 | Stop reason: HOSPADM

## 2017-10-25 RX ORDER — EPINEPHRINE 0.1 MG/ML
1 INJECTION INTRACARDIAC; INTRAVENOUS
Status: DISCONTINUED | OUTPATIENT
Start: 2017-10-25 | End: 2017-10-25 | Stop reason: HOSPADM

## 2017-10-25 RX ORDER — LIDOCAINE HYDROCHLORIDE 20 MG/ML
INJECTION, SOLUTION EPIDURAL; INFILTRATION; INTRACAUDAL; PERINEURAL AS NEEDED
Status: DISCONTINUED | OUTPATIENT
Start: 2017-10-25 | End: 2017-10-25 | Stop reason: HOSPADM

## 2017-10-25 RX ORDER — FLUMAZENIL 0.1 MG/ML
0.2 INJECTION INTRAVENOUS
Status: DISCONTINUED | OUTPATIENT
Start: 2017-10-25 | End: 2017-10-25 | Stop reason: HOSPADM

## 2017-10-25 RX ORDER — SODIUM CHLORIDE 0.9 % (FLUSH) 0.9 %
5-10 SYRINGE (ML) INJECTION AS NEEDED
Status: DISCONTINUED | OUTPATIENT
Start: 2017-10-25 | End: 2017-10-25 | Stop reason: HOSPADM

## 2017-10-25 RX ORDER — MIDAZOLAM HYDROCHLORIDE 1 MG/ML
.25-1 INJECTION, SOLUTION INTRAMUSCULAR; INTRAVENOUS
Status: DISCONTINUED | OUTPATIENT
Start: 2017-10-25 | End: 2017-10-25 | Stop reason: HOSPADM

## 2017-10-25 RX ORDER — NALOXONE HYDROCHLORIDE 0.4 MG/ML
0.4 INJECTION, SOLUTION INTRAMUSCULAR; INTRAVENOUS; SUBCUTANEOUS
Status: DISCONTINUED | OUTPATIENT
Start: 2017-10-25 | End: 2017-10-25 | Stop reason: HOSPADM

## 2017-10-25 RX ORDER — FENTANYL CITRATE 50 UG/ML
100 INJECTION, SOLUTION INTRAMUSCULAR; INTRAVENOUS
Status: DISCONTINUED | OUTPATIENT
Start: 2017-10-25 | End: 2017-10-25 | Stop reason: HOSPADM

## 2017-10-25 RX ORDER — ATROPINE SULFATE 0.1 MG/ML
0.5 INJECTION INTRAVENOUS
Status: DISCONTINUED | OUTPATIENT
Start: 2017-10-25 | End: 2017-10-25 | Stop reason: HOSPADM

## 2017-10-25 RX ORDER — PROPOFOL 10 MG/ML
INJECTION, EMULSION INTRAVENOUS AS NEEDED
Status: DISCONTINUED | OUTPATIENT
Start: 2017-10-25 | End: 2017-10-25 | Stop reason: HOSPADM

## 2017-10-25 RX ORDER — SODIUM CHLORIDE 9 MG/ML
50 INJECTION, SOLUTION INTRAVENOUS CONTINUOUS
Status: DISCONTINUED | OUTPATIENT
Start: 2017-10-25 | End: 2017-10-25 | Stop reason: HOSPADM

## 2017-10-25 RX ADMIN — SODIUM CHLORIDE: 9 INJECTION, SOLUTION INTRAVENOUS at 11:45

## 2017-10-25 RX ADMIN — PROPOFOL 40 MG: 10 INJECTION, EMULSION INTRAVENOUS at 12:37

## 2017-10-25 RX ADMIN — PROPOFOL 40 MG: 10 INJECTION, EMULSION INTRAVENOUS at 12:34

## 2017-10-25 RX ADMIN — PROPOFOL 40 MG: 10 INJECTION, EMULSION INTRAVENOUS at 12:47

## 2017-10-25 RX ADMIN — PROPOFOL 60 MG: 10 INJECTION, EMULSION INTRAVENOUS at 12:32

## 2017-10-25 RX ADMIN — PROPOFOL 40 MG: 10 INJECTION, EMULSION INTRAVENOUS at 12:43

## 2017-10-25 RX ADMIN — PROPOFOL 40 MG: 10 INJECTION, EMULSION INTRAVENOUS at 12:41

## 2017-10-25 RX ADMIN — PROPOFOL 40 MG: 10 INJECTION, EMULSION INTRAVENOUS at 12:45

## 2017-10-25 RX ADMIN — PROPOFOL 40 MG: 10 INJECTION, EMULSION INTRAVENOUS at 12:39

## 2017-10-25 RX ADMIN — SODIUM CHLORIDE: 9 INJECTION, SOLUTION INTRAVENOUS at 12:43

## 2017-10-25 RX ADMIN — LIDOCAINE HYDROCHLORIDE 20 MG: 20 INJECTION, SOLUTION EPIDURAL; INFILTRATION; INTRACAUDAL; PERINEURAL at 12:32

## 2017-10-25 NOTE — H&P
[address]  [address] 140 [facility], [address]  [phone]                                History and Physical     NAME: Pasquale Quick Sr.   :  1945   MRN:  [id number]     HPI:  The patient was seen and examined. Past Surgical History:   Procedure Laterality Date    HX HERNIA REPAIR  \"when I was 13\"     Past Medical History:   Diagnosis Date    Swan's esophagus     Gastrointestinal disorder     GERD (gastroesophageal reflux disease)     Ill-defined condition     lyme's disease     Social History   Substance Use Topics    Smoking status: Never Smoker    Smokeless tobacco: Never Used    Alcohol use No     Allergies   Allergen Reactions    Adhesive Tape-Silicones Other (comments)     Some bandaids \"breaks skin out\" per wife.      Family History   Problem Relation Age of Onset    Cancer Father      leukemia     Current Facility-Administered Medications   Medication Dose Route Frequency    0.9% sodium chloride infusion  50 mL/hr IntraVENous CONTINUOUS    sodium chloride (NS) flush 5-10 mL  5-10 mL IntraVENous Q8H    sodium chloride (NS) flush 5-10 mL  5-10 mL IntraVENous PRN    midazolam (VERSED) injection 0.25-10 mg  0.25-10 mg IntraVENous Multiple    fentaNYL citrate (PF) injection 100 mcg  100 mcg IntraVENous MULTIPLE DOSE GIVEN    naloxone (NARCAN) injection 0.4 mg  0.4 mg IntraVENous Multiple    flumazenil (ROMAZICON) 0.1 mg/mL injection 0.2 mg  0.2 mg IntraVENous Multiple    simethicone (MYLICON) 95CS/0.9NK oral drops 80 mg  1.2 mL Oral Multiple    atropine injection 0.5 mg  0.5 mg IntraVENous ONCE PRN    EPINEPHrine (ADRENALIN) 0.1 mg/mL syringe 1 mg  1 mg Endoscopically ONCE PRN     Facility-Administered Medications Ordered in Other Encounters   Medication Dose Route Frequency    0.9% sodium chloride infusion   IntraVENous CONTINUOUS    lidocaine (PF) (XYLOCAINE) 20 mg/mL (2 %) injection   IntraVENous PRN    propofol (DIPRIVAN) 10 mg/mL injection   IntraVENous PRN         PHYSICAL EXAM:  General: WD, WN. Alert, cooperative, no acute distress    HEENT: NC, Atraumatic. PERRLA, EOMI. Anicteric sclerae. Lungs:  CTA Bilaterally. No Wheezing/Rhonchi/Rales. Heart:  Regular  rhythm,  No murmur, No Rubs, No Gallops  Abdomen: Soft, Non distended, Non tender.  +Bowel sounds, no HSM  Extremities: No c/c/e  Neurologic:  CN 2-12 gi, Alert and oriented X 3. No acute neurological distress   Psych:   Good insight. Not anxious nor agitated. The heart, lungs and mental status were satisfactory for the administration of MAC sedation and for the procedure.       Mallampati score: 3       Assessment:   · Swan's with L:GD    Plan:   · Endoscopic procedure  · MAC sedation   ·

## 2017-10-25 NOTE — DISCHARGE INSTRUCTIONS
[address] [facility].  [address] [place] [address]  [phone]                     Discharge Instructions after Ablation of Swan's Esophagus    You have undergone an upper Endoscopy with ablation of Swan's esophagus. You may experience one or more of the following symptoms after treatment: chest discomfort, sore throat, difficulty or pain when swallowing and /or nausea/vomiting. These symptoms should improve with each day. You will be provided with several medications and specific instructions (below) to make you as comfortable as possible during this time. Should any of your symptoms be more severe in nature or longer in duration then we have described, please contact your physician. It is important to continue a strict and long-term regimen of anti-secretory medication after this treatment, such as Nexium, Prevacid, or other similar medication. ·  Maximize anti-secretory regimen, per MD  Please DO NOT stop taking this medicine. If you are unable to swallow the pill you may crush it if allowed or contact your physician for a liquid form. ·  Antacid/lidocaine mixture by mouth as needed, per MD    ·  Liquid acetaminophen (Tylenol) with or without codeine by mouth as needed, per  MD    ·  Anti-emetic (anti-nausea) medication per rectum as needed, per MD    ·  Full liquid diet for 24 hours, and then advance to soft diet for 1 week. Avoid extreme cold or hot beverage and food. Avoid aspirin or non-steroidal anti-inflammatory medications (motrin, advil, and Ibuprofen)    ·  Contact your treating physician immediately for significant chest pain, difficulty swallowing, fever, bleeding, abdominal pain, difficulty breathing, vomiting or other warning signs provided by the physician, so that the physician may complete the appropriate diagnostic work-up and/or interventions as needed to avoid complications.     ·  If you seek care for a digestive issue from any healthcare personnel in the  next 6 months following this procedure, other than the treating physician, the treating physician should be consulted before any treatment isInitiated.

## 2017-10-25 NOTE — ANESTHESIA PREPROCEDURE EVALUATION
Anesthetic History   No history of anesthetic complications            Review of Systems / Medical History  Patient summary reviewed, nursing notes reviewed and pertinent labs reviewed    Pulmonary  Within defined limits                 Neuro/Psych   Within defined limits           Cardiovascular  Within defined limits                     GI/Hepatic/Renal     GERD           Endo/Other  Within defined limits           Other Findings              Physical Exam    Airway  Mallampati: II  TM Distance: > 6 cm  Neck ROM: normal range of motion   Mouth opening: Normal     Cardiovascular  Regular rate and rhythm,  S1 and S2 normal,  no murmur, click, rub, or gallop             Dental  No notable dental hx       Pulmonary  Breath sounds clear to auscultation               Abdominal  GI exam deferred       Other Findings            Anesthetic Plan    ASA: 2  Anesthesia type: MAC          Induction: Intravenous  Anesthetic plan and risks discussed with: Patient

## 2017-10-25 NOTE — ANESTHESIA POSTPROCEDURE EVALUATION
Post-Anesthesia Evaluation and Assessment    Patient: Carlos Ramirez Sr. MRN: [id number]  SSN: [id number]    YOB: 1945  Age: 67 y.o. Sex: male       Cardiovascular Function/Vital Signs  Visit Vitals    /83    Pulse (!) 57    Temp 36.1 °C (96.9 °F)    Resp 10    Ht 5' 10\" (1.778 m)    Wt 75.8 kg (167 lb 3.2 oz)    SpO2 100%    BMI 23.99 kg/m2       Patient is status post MAC anesthesia for Procedure(s):  Endoscopic HALO Ablation  ESOPHAGOGASTRODUODENOSCOPY (EGD). Nausea/Vomiting: None    Postoperative hydration reviewed and adequate. Pain:  Pain Scale 1: Visual (10/25/17 1258)  Pain Intensity 1: 0 (10/25/17 1258)   Managed    Neurological Status: At baseline    Mental Status and Level of Consciousness: Arousable    Pulmonary Status:   O2 Device: Room air (10/25/17 1258)   Adequate oxygenation and airway patent    Complications related to anesthesia: None    Post-anesthesia assessment completed.  No concerns    Signed By: Cory Schlatter, DO     October 25, 2017

## 2017-10-25 NOTE — ROUTINE PROCESS
Aubree Tyler .  1945  [id number]    Situation:  Verbal report received from: Chino Byrd  Procedure: Procedure(s):  Endoscopic HALO Ablation  ESOPHAGOGASTRODUODENOSCOPY (EGD)    Background:    Preoperative diagnosis: BARRETTS  Postoperative diagnosis: 1.- Swan's Esophagus  2.- Hiatal Hernia    :  Dr. Estevan Cooper  Assistant(s): Endoscopy Technician-1: Edith Wells RN-1: Abelino Che RN    Specimens: * No specimens in log *  H. Pylori  no    Assessment:  Intra-procedure medications     Anesthesia gave intra-procedure sedation and medications, see anesthesia flow sheet yes    Intravenous fluids: NS@ KVO     Vital signs stable     Abdominal assessment: round and soft     Recommendation:  Discharge patient per MD order.     Family or Friend   Permission to share finding with family or friend yes

## 2017-10-25 NOTE — PROCEDURES
118 [facility].  [address] [facility] 140 [address], [address]  [phone]                            NAME:  Carlos Ramirez Sr.   :   1945   MRN:   [id number]     Date/Time:  10/25/2017 12:51 PM    Endoscopy with Channel Ablation of Esophageal Tissue Procedure Note    :  Raynelle Boast, MD    Referring Provider:  Duncan Campa MD    Anethesia/Sedation:  MAC anesthesia    Baseline Patient History:  · Length of IM: 11 cm  · Presence of Dysplasia YES  · GERD Symptoms NO  · Anti-Secretory Therapy Yes    Pre-Op Diagnosis: Swan's esophagus, with LGD  Post-Op Diagnosis: Swan's esophagus, hiatal hernia    Summary of Findings:  Esophagus: Few salmon colored islands were seen starting at 27 cm and extended to 38 cm (top of gastric folds). No nodule, ulceration or granularity was seen. NBI was used. Medium sliding hiatal hernia was seen  Stomach: Normal  Duodenum: Normal    Procedure Description:  The patient was positioned in the left lateral decubitus position and vital sign monitoring equipment connected in the usual manner. Intravenous sedation was administered. (Esophagoscopy or EGD) was performed with identification of the top of the intestinal metaplasia and the top of the gastric folds. This a follow up examand the estimate of resolution from baseline is 75%. The distance from the bite block to each of these anatomic landmarks was recorded, and the total length of intestinal metaplasia calculated from these measurements. These landmarks were located as follows:    Top of intestinal metaplasia: 27 cm  Top of the gastric folds: 38 cm    NBI was used. The Swan's esophagus tissue was irrigated with water. The endoscope was introduced. The Ablation Catheter was introduced through the endoscope working channel. Swans tissue was targeted.  The endoscope with Ablation Catheter was positioned under direct visualization so that the Ablation Catheter was in contact with Swans tissue. Energy was applied twice at a power density of 48 W/cm2 and energy density of 12 J/cm2. The electrode was then moved to the next region of visible of Swans tissue. Ablation was repeated until all visible Swans tissue was ablated. The Ablation Catheter was removed through the endoscope working channel and cleaned. The endoscope was left in place. The ablation zone was cleaned of coagulative debris. The Ablation Catheter was then reintroduced. A second complete ablation set was applied as in the first treatment set. The Ablation Catheter was removed through the endoscope working channel. A total of 42 ablations were performed. The ablated area was inspected. The endoscope was then removed. Follow Up:   Follow up EGD with HALO in 3 months.  Follow post HALo ablation guidelines.

## 2017-10-25 NOTE — PROGRESS NOTES

## 2017-10-25 NOTE — IP AVS SNAPSHOT
27[address] [facility]. [address] 
[phone] Patient: Susana Cool Sr. MRN: [id number] NBE:[birth year] My Medications TAKE these medications as instructed Instructions Each Dose to Equal  
 Morning Noon Evening Bedtime  
 aspirin delayed-release 81 mg tablet Your last dose was: Your next dose is: Take 81 mg by mouth daily. 81 mg  
    
   
   
   
  
 finasteride 5 mg tablet Commonly known as:  PROSCAR Your last dose was: Your next dose is: Take 5 mg by mouth daily. 5 mg  
    
   
   
   
  
 omeprazole 20 mg capsule Commonly known as:  PRILOSEC Your last dose was: Your next dose is: Take 2 Caps by mouth two (2) times a day. Before meals  40 mg

## 2017-10-25 NOTE — IP AVS SNAPSHOT
[address] 
[phone] Patient: Oliver Cody Sr. MRN: [id number] AVV:6/14/2659 About your hospitalization You were admitted on:  October 25, 2017 You last received care in the:  [facility] ENDOSCOPY You were discharged on:  October 25, 2017 Why you were hospitalized Your primary diagnosis was:  Not on File Discharge Orders None A check jose indicates which time of day the medication should be taken. My Medications TAKE these medications as instructed Instructions Each Dose to Equal  
 Morning Noon Evening Bedtime  
 aspirin delayed-release 81 mg tablet Your last dose was: Your next dose is: Take 81 mg by mouth daily. 81 mg  
    
   
   
   
  
 finasteride 5 mg tablet Commonly known as:  PROSCAR Your last dose was: Your next dose is: Take 5 mg by mouth daily. 5 mg  
    
   
   
   
  
 omeprazole 20 mg capsule Commonly known as:  PRILOSEC Your last dose was: Your next dose is: Take 2 Caps by mouth two (2) times a day. Before meals 40 mg Discharge Instructions [address] 
217 [facility] 252 [facility], [address] 
[phone] Discharge Instructions after Ablation of Swan's Esophagus You have undergone an upper Endoscopy with ablation of Swan's esophagus. You may experience one or more of the following symptoms after treatment: chest discomfort, sore throat, difficulty or pain when swallowing and /or nausea/vomiting. These symptoms should improve with each day. You will be provided with several medications and specific instructions (below) to make you as comfortable as possible during this time.   Should any of your symptoms be more severe in nature or longer in duration then we have described, please contact your physician. It is important to continue a strict and long-term regimen of anti-secretory medication after this treatment, such as Nexium, Prevacid, or other similar medication. ·  Maximize anti-secretory regimen, per MD  Please DO NOT stop taking this medicine. If you are unable to swallow the pill you may crush it if allowed or contact your physician for a liquid form. ·  Antacid/lidocaine mixture by mouth as needed, per MD 
 
·  Liquid acetaminophen (Tylenol) with or without codeine by mouth as needed, per  MD 
 
·  Anti-emetic (anti-nausea) medication per rectum as needed, per MD 
 
·  Full liquid diet for 24 hours, and then advance to soft diet for 1 week. Avoid extreme cold or hot beverage and food. Avoid aspirin or non-steroidal anti-inflammatory medications (motrin, advil, and Ibuprofen) ·  Contact your treating physician immediately for significant chest pain, difficulty swallowing, fever, bleeding, abdominal pain, difficulty breathing, vomiting or other warning signs provided by the physician, so that the physician may complete the appropriate diagnostic work-up and/or interventions as needed to avoid complications. ·  If you seek care for a digestive issue from any healthcare personnel in the  next 6 months following this procedure, other than the treating physician, the treating physician should be consulted before any treatment isInitiated. Introducing [facility]! Dear Lavinia Hinojosa: Thank you for requesting a [employer] account. Our records indicate that you already have an active [employer] account. You can access your account anytime at [email or web address] Did you know that you can access your hospital and ER discharge instructions at any time in [employer]? You can also review all of your test results from your hospital stay or ER visit. Additional Information If you have questions, please visit the Frequently Asked Questions section of the [employer] website at [email or web address] Remember, MyChart is NOT to be used for urgent needs. For medical emergencies, dial 911. Now available from your iPhone and Android! Providers Seen During Your Hospitalization Provider Specialty Primary office phone Lorena Gonzales MD Gastroenterology [phone] Your Primary Care Physician (PCP) Primary Care Physician Office Phone Office Fax Mehnaz Trevino [phone] You are allergic to the following Allergen Reactions Adhesive Tape-Silicones Other (comments) Some bandaids \"breaks skin out\" per wife. Recent Documentation Height Weight BMI Smoking Status 1.778 m 75.8 kg 23.99 kg/m2 Never Smoker Emergency Contacts Name Discharge Info Relation Home Work Mobile Marysol Strickland DISCHARGE CAREGIVER [3] Spouse [3]   [phone] Patient Belongings The following personal items are in your possession at time of discharge: 
  Dental Appliances: Lowers, Partials  Visual Aid: None Please provide this summary of care documentation to your next provider. Signatures-by signing, you are acknowledging that this After Visit Summary has been reviewed with you and you have received a copy. Patient Signature:  ____________________________________________________________ Date:  ____________________________________________________________  
  
Fatimah Hernandez Provider Signature:  ____________________________________________________________ Date:  ____________________________________________________________

## 2018-02-02 ENCOUNTER — HOSPITAL ENCOUNTER (OUTPATIENT)
Age: 73
Setting detail: OUTPATIENT SURGERY
Discharge: HOME | End: 2018-02-02
Attending: INTERNAL MEDICINE | Admitting: INTERNAL MEDICINE
Payer: MEDICARE

## 2018-02-02 ENCOUNTER — ANESTHESIA (OUTPATIENT)
Dept: ENDOSCOPY | Age: 73
End: 2018-02-02
Payer: MEDICARE

## 2018-02-02 ENCOUNTER — ANESTHESIA EVENT (OUTPATIENT)
Dept: ENDOSCOPY | Age: 73
End: 2018-02-02
Payer: MEDICARE

## 2018-02-02 VITALS
HEIGHT: 71 IN | HEART RATE: 55 BPM | DIASTOLIC BLOOD PRESSURE: 73 MMHG | RESPIRATION RATE: 16 BRPM | BODY MASS INDEX: 23.3 KG/M2 | TEMPERATURE: 97.9 F | SYSTOLIC BLOOD PRESSURE: 127 MMHG | WEIGHT: 166.44 LBS | OXYGEN SATURATION: 100 %

## 2018-02-02 PROCEDURE — 76040000019: Performed by: INTERNAL MEDICINE

## 2018-02-02 PROCEDURE — 74011000250 HC RX REV CODE- 250

## 2018-02-02 PROCEDURE — C1886 CATHETER, ABLATION: HCPCS | Performed by: INTERNAL MEDICINE

## 2018-02-02 PROCEDURE — 74011250636 HC RX REV CODE- 250/636: Performed by: INTERNAL MEDICINE

## 2018-02-02 PROCEDURE — 76060000031 HC ANESTHESIA FIRST 0.5 HR: Performed by: INTERNAL MEDICINE

## 2018-02-02 PROCEDURE — 74011250636 HC RX REV CODE- 250/636

## 2018-02-02 RX ORDER — FENTANYL CITRATE 50 UG/ML
100 INJECTION, SOLUTION INTRAMUSCULAR; INTRAVENOUS
Status: DISCONTINUED | OUTPATIENT
Start: 2018-02-02 | End: 2018-02-02 | Stop reason: HOSPADM

## 2018-02-02 RX ORDER — NALOXONE HYDROCHLORIDE 0.4 MG/ML
0.4 INJECTION, SOLUTION INTRAMUSCULAR; INTRAVENOUS; SUBCUTANEOUS
Status: DISCONTINUED | OUTPATIENT
Start: 2018-02-02 | End: 2018-02-02 | Stop reason: HOSPADM

## 2018-02-02 RX ORDER — SODIUM CHLORIDE 0.9 % (FLUSH) 0.9 %
5-10 SYRINGE (ML) INJECTION AS NEEDED
Status: DISCONTINUED | OUTPATIENT
Start: 2018-02-02 | End: 2018-02-02 | Stop reason: HOSPADM

## 2018-02-02 RX ORDER — PROPOFOL 10 MG/ML
INJECTION, EMULSION INTRAVENOUS AS NEEDED
Status: DISCONTINUED | OUTPATIENT
Start: 2018-02-02 | End: 2018-02-02 | Stop reason: HOSPADM

## 2018-02-02 RX ORDER — EPINEPHRINE 0.1 MG/ML
1 INJECTION INTRACARDIAC; INTRAVENOUS
Status: DISCONTINUED | OUTPATIENT
Start: 2018-02-02 | End: 2018-02-02 | Stop reason: HOSPADM

## 2018-02-02 RX ORDER — LIDOCAINE HYDROCHLORIDE 20 MG/ML
INJECTION, SOLUTION EPIDURAL; INFILTRATION; INTRACAUDAL; PERINEURAL AS NEEDED
Status: DISCONTINUED | OUTPATIENT
Start: 2018-02-02 | End: 2018-02-02 | Stop reason: HOSPADM

## 2018-02-02 RX ORDER — MIDAZOLAM HYDROCHLORIDE 1 MG/ML
.25-1 INJECTION, SOLUTION INTRAMUSCULAR; INTRAVENOUS
Status: DISCONTINUED | OUTPATIENT
Start: 2018-02-02 | End: 2018-02-02 | Stop reason: HOSPADM

## 2018-02-02 RX ORDER — FLUMAZENIL 0.1 MG/ML
0.2 INJECTION INTRAVENOUS
Status: DISCONTINUED | OUTPATIENT
Start: 2018-02-02 | End: 2018-02-02 | Stop reason: HOSPADM

## 2018-02-02 RX ORDER — DEXTROMETHORPHAN/PSEUDOEPHED 2.5-7.5/.8
1.2 DROPS ORAL
Status: DISCONTINUED | OUTPATIENT
Start: 2018-02-02 | End: 2018-02-02 | Stop reason: HOSPADM

## 2018-02-02 RX ORDER — ATROPINE SULFATE 0.1 MG/ML
0.5 INJECTION INTRAVENOUS
Status: DISCONTINUED | OUTPATIENT
Start: 2018-02-02 | End: 2018-02-02 | Stop reason: HOSPADM

## 2018-02-02 RX ORDER — SODIUM CHLORIDE 9 MG/ML
INJECTION, SOLUTION INTRAVENOUS
Status: DISCONTINUED | OUTPATIENT
Start: 2018-02-02 | End: 2018-02-02

## 2018-02-02 RX ORDER — SODIUM CHLORIDE 9 MG/ML
50 INJECTION, SOLUTION INTRAVENOUS CONTINUOUS
Status: DISCONTINUED | OUTPATIENT
Start: 2018-02-02 | End: 2018-02-02 | Stop reason: HOSPADM

## 2018-02-02 RX ORDER — SODIUM CHLORIDE 0.9 % (FLUSH) 0.9 %
5-10 SYRINGE (ML) INJECTION EVERY 8 HOURS
Status: DISCONTINUED | OUTPATIENT
Start: 2018-02-02 | End: 2018-02-02 | Stop reason: HOSPADM

## 2018-02-02 RX ORDER — SODIUM CHLORIDE 9 MG/ML
INJECTION, SOLUTION INTRAVENOUS
Status: DISCONTINUED | OUTPATIENT
Start: 2018-02-02 | End: 2018-02-02 | Stop reason: HOSPADM

## 2018-02-02 RX ADMIN — SODIUM CHLORIDE: 9 INJECTION, SOLUTION INTRAVENOUS at 11:00

## 2018-02-02 RX ADMIN — PROPOFOL 100 MG: 10 INJECTION, EMULSION INTRAVENOUS at 11:06

## 2018-02-02 RX ADMIN — LIDOCAINE HYDROCHLORIDE 100 MG: 20 INJECTION, SOLUTION EPIDURAL; INFILTRATION; INTRACAUDAL; PERINEURAL at 11:06

## 2018-02-02 RX ADMIN — PROPOFOL 50 MG: 10 INJECTION, EMULSION INTRAVENOUS at 11:13

## 2018-02-02 RX ADMIN — PROPOFOL 50 MG: 10 INJECTION, EMULSION INTRAVENOUS at 11:10

## 2018-02-02 RX ADMIN — SODIUM CHLORIDE 50 ML/HR: 900 INJECTION, SOLUTION INTRAVENOUS at 10:19

## 2018-02-02 RX ADMIN — PROPOFOL 100 MG: 10 INJECTION, EMULSION INTRAVENOUS at 11:08

## 2018-02-02 NOTE — DISCHARGE INSTRUCTIONS
[address] [facility] [address]  [address] 1478 [facility]  [phone]                     Discharge Instructions after Ablation of Swan's Esophagus    You have undergone an upper Endoscopy with ablation of Swan's esophagus. You may experience one or more of the following symptoms after treatment: chest discomfort, sore throat, difficulty or pain when swallowing and /or nausea/vomiting. These symptoms should improve with each day. You will be provided with several medications and specific instructions (below) to make you as comfortable as possible during this time. Should any of your symptoms be more severe in nature or longer in duration then we have described, please contact your physician. It is important to continue a strict and long-term regimen of anti-secretory medication after this treatment, such as Nexium, Prevacid, or other similar medication. ·  Maximize anti-secretory regimen, per MD  Please DO NOT stop taking this   medicine. If you are unable to swallow the pill you may crush it if allowed   or contact your physician for a liquid form. ·  Antacid/lidocaine mixture by mouth as needed, per MD    ·  Liquid acetaminophen (Tylenol) with or without codeine by mouth as needed, per  MD    ·  Anti-emetic (anti-nausea) medication per rectum as needed, per MD    ·  Full liquid diet for 24 hours, and then advance to soft diet for 1 week. Avoid   extreme cold or hot beverage and food. Avoid aspirin or non-steroidal anti-inflammatory medications (motrin, advil, and   Ibuprofen)    ·  Contact your treating physician immediately for significant chest pain, difficulty   swallowing, fever, bleeding, abdominal pain, difficulty breathing, vomiting or   other warning signs provided by the physician, so that the physician may    complete the appropriate diagnostic work-up and/or interventions as needed to   avoid complications.     ·  If you seek care for a digestive issue from any healthcare personnel in the   next 6 months following this procedure, other than the treating physician,   the treating physician should be consulted before any treatment is    Initiated.

## 2018-02-02 NOTE — PROCEDURES
118 [facility]  217 [facility] [address], [address]   [phone]                            NAME:  Gladys Roberson Sr.   :   1945   MRN:   [id number]     Date/Time:  2018 11:22 AM    Endoscopy with Channel Ablation of Esophageal Tissue Procedure Note    :  Fidel Green MD    Referring Provider:  Mariama Antoine MD    Anethesia/Sedation:  MAC anesthesia    Baseline Patient History:  · Length of IM: 3 cm  · Presence of Dysplasia YES  · GERD Symptoms NO  · Anti-Secretory Therapy No, stopped on his own  Pre-Op Diagnosis: Sawn's esophagus with low grade dysplasia  Post-Op Diagnosis: Swan's esophagus, hiatal hernia, esophagitis  Summary of Findings:   Esophagus: Few salmon colored islands were seen starting at 27 cm and extended to 38 cm (top of gastric folds). Severe esophagitis and ulceration was noted all the way upto 28 cm. Noteworthy, he stopped his PPI as he was not having any symptoms. NBI was used. Medium sliding hiatal hernia was seen  Stomach: Normal  Duodenum: Normal    Procedure Description:  The patient was positioned in the left lateral decubitus position and vital sign monitoring equipment connected in the usual manner. Intravenous sedation was administered. (Esophagoscopy or EGD) was performed with identification of the top of the intestinal metaplasia and the top of the gastric folds. The distance from the bite block to each of these anatomic landmarks was recorded, and the total length of intestinal metaplasia calculated from these measurements. These landmarks were located as follows:    Top of intestinal metaplasia: 28 cm  Top of the gastric folds: 38 cm    The endoscope was introduced. The Ablation Catheter was introduced through the endoscope working channel. Swans tissue was targeted. The endoscope with Ablation Catheter was positioned under direct visualization so that the Ablation Catheter was in contact with Swans tissue. Energy was applied twice at a power density of 48 W/cm2 and energy density of 12 J/cm2. The electrode was then moved to the next region of visible of Swans tissue. Ablation was repeated until all visible Swans tissue was ablated. The ablation zone was not cleaned of coagulative debris due to presence of ulcers in adjoining areas. The Ablation Catheter was not reintroduced. The endoscope was then removed.     Follow Up:   Start Omeprazole 40 mg PO BID   EGD with HALO ablation in 6 months   Refer to Dr Malorie Chavira for consideration of hiatal hernia repair

## 2018-02-02 NOTE — H&P
118 [facility].  217 [facility] 140 [facility], [address]   [phone]                                History and Physical     NAME: Amanda Galan Sr.   :  1945   MRN:  [id number]     HPI:  The patient was seen and examined. Past Surgical History:   Procedure Laterality Date    HX HERNIA REPAIR  \"when I was 13\"     Past Medical History:   Diagnosis Date    Swan's esophagus     Gastrointestinal disorder     GERD (gastroesophageal reflux disease)     Ill-defined condition     lyme's disease     Social History   Substance Use Topics    Smoking status: Never Smoker    Smokeless tobacco: Never Used    Alcohol use No     Allergies   Allergen Reactions    Adhesive Tape-Silicones Other (comments)     Some bandaids \"breaks skin out\" per wife. Family History   Problem Relation Age of Onset    Cancer Father      leukemia     Current Facility-Administered Medications   Medication Dose Route Frequency    0.9% sodium chloride infusion  50 mL/hr IntraVENous CONTINUOUS    sodium chloride (NS) flush 5-10 mL  5-10 mL IntraVENous Q8H    sodium chloride (NS) flush 5-10 mL  5-10 mL IntraVENous PRN    midazolam (VERSED) injection 0.25-10 mg  0.25-10 mg IntraVENous Multiple    fentaNYL citrate (PF) injection 100 mcg  100 mcg IntraVENous MULTIPLE DOSE GIVEN    naloxone (NARCAN) injection 0.4 mg  0.4 mg IntraVENous Multiple    flumazenil (ROMAZICON) 0.1 mg/mL injection 0.2 mg  0.2 mg IntraVENous Multiple    simethicone (MYLICON) 21CZ/0.8LV oral drops 80 mg  1.2 mL Oral Multiple    atropine injection 0.5 mg  0.5 mg IntraVENous ONCE PRN    EPINEPHrine (ADRENALIN) 0.1 mg/mL syringe 1 mg  1 mg Endoscopically ONCE PRN         PHYSICAL EXAM:  General: WD, WN. Alert, cooperative, no acute distress    HEENT: NC, Atraumatic. PERRLA, EOMI. Anicteric sclerae. Lungs:  CTA Bilaterally. No Wheezing/Rhonchi/Rales.   Heart:  Regular  rhythm,  No murmur, No Rubs, No Gallops  Abdomen: Soft, Non distended, Non tender.  +Bowel sounds, no HSM  Extremities: No c/c/e  Neurologic:  CN 2-12 gi, Alert and oriented X 3. No acute neurological distress   Psych:   Good insight. Not anxious nor agitated. The heart, lungs and mental status were satisfactory for the administration of MAC sedation and for the procedure.       Mallampati score: 2       Assessment:   · Swan's with LGD    Plan:   · Endoscopic procedure  · MAC sedation   ·

## 2018-02-02 NOTE — PROGRESS NOTES

## 2018-02-02 NOTE — ANESTHESIA POSTPROCEDURE EVALUATION
Post-Anesthesia Evaluation and Assessment    Patient: Alix Martins Sr. MRN: [id number]  SSN: [id number]    YOB: 1945  Age: 67 y.o. Sex: male       Cardiovascular Function/Vital Signs  Visit Vitals    /65    Pulse 66    Temp 37.1 °C (98.8 °F)    Resp 17    Ht 5' 10.5\" (1.791 m)    Wt 75.5 kg (166 lb 7 oz)    SpO2 96%    BMI 23.54 kg/m2       Patient is status post MAC anesthesia for Procedure(s):  ENDOSCOPIC HALO ABLATION  ESOPHAGOGASTRODUODENOSCOPY (EGD). Nausea/Vomiting: None    Postoperative hydration reviewed and adequate. Pain:  Pain Scale 1: Adult Nonverbal Pain Scale (02/02/18 1125)  Pain Intensity 1: 0 (02/02/18 1125)   Managed    Neurological Status: At baseline    Mental Status and Level of Consciousness: Arousable    Pulmonary Status:   O2 Device: Room air (02/02/18 1125)   Adequate oxygenation and airway patent    Complications related to anesthesia: None    Post-anesthesia assessment completed.  No concerns    Signed By: Toyin Sosa MD     February 2, 2018

## 2018-02-02 NOTE — ROUTINE PROCESS
[facility].  1945  [id number]    Situation:  Verbal report received from: Izzy Rico, RN  Procedure: Procedure(s):  ENDOSCOPIC HALO ABLATION  ESOPHAGOGASTRODUODENOSCOPY (EGD)    Background:    Preoperative diagnosis: BARRETTS ESOPHAGUS   Postoperative diagnosis: 1. Hiatal Hernia  2. Grade E Esophagitis    :  Dr. Sayra Bhatt  Assistant(s): Endoscopy Technician-1: Jae Escobar  Endoscopy RN-1: Bina Luna RN    Specimens: * No specimens in log *  H. Pylori  no    Assessment:  Intra-procedure medications     Anesthesia gave intra-procedure sedation and medications, see anesthesia flow sheet yes    Intravenous fluids: NS@ KVO     Vital signs stable     Abdominal assessment: round and soft     Recommendation:  Discharge patient per MD order.     Family or Friend   Permission to share finding with family or friend yes

## 2018-02-02 NOTE — IP AVS SNAPSHOT
[address] 
[phone] Patient: Eduardo Larsen Sr. MRN: [id number] PNR:0/17/7300 About your hospitalization You were admitted on:  February 2, 2018 [facility] You [facility] discharged on:  February 2, 2018 Why you were hospitalized Your primary diagnosis was:  Not on File Follow-up Information None Discharge Orders None A check jose indicates which time of day the medication should be taken. My Medications Notice You have not been prescribed any medications. Discharge Instructions [address] 
217 [facility] [address] [place], [address] 
[phone] Discharge Instructions after Ablation of Swan's Esophagus You have undergone an upper Endoscopy with ablation of Swan's esophagus. You may experience one or more of the following symptoms after treatment: chest discomfort, sore throat, difficulty or pain when swallowing and /or nausea/vomiting. These symptoms should improve with each day. You will be provided with several medications and specific instructions (below) to make you as comfortable as possible during this time. Should any of your symptoms be more severe in nature or longer in duration then we have described, please contact your physician. It is important to continue a strict and long-term regimen of anti-secretory medication after this treatment, such as Nexium, Prevacid, or other similar medication. ·  Maximize anti-secretory regimen, per MD  Please DO NOT stop taking this   medicine. If you are unable to swallow the pill you may crush it if allowed   or contact your physician for a liquid form.  
 
·  Antacid/lidocaine mixture by mouth as needed, per MD 
 
·  Liquid acetaminophen (Tylenol) with or without codeine by mouth as needed, per  MD 
 
 ·  Anti-emetic (anti-nausea) medication per rectum as needed, per MD 
 
·  Full liquid diet for 24 hours, and then advance to soft diet for 1 week. Avoid   extreme cold or hot beverage and food. Avoid aspirin or non-steroidal anti-inflammatory medications (motrin, advil, and   Ibuprofen) ·  Contact your treating physician immediately for significant chest pain, difficulty   swallowing, fever, bleeding, abdominal pain, difficulty breathing, vomiting or   other warning signs provided by the physician, so that the physician may    complete the appropriate diagnostic work-up and/or interventions as needed to   avoid complications. ·  If you seek care for a digestive issue from any healthcare personnel in the   next 6 months following this procedure, other than the treating physician,   the treating physician should be consulted before any treatment is    Initiated. Introducing [facility]! Dear Chuy Saba: Thank you for requesting a [employer] account. Our records indicate that you already have an active [employer] account. You can access your account anytime at [email or web address] Did you know that you can access your hospital and ER discharge instructions at any time in [employer]? You can also review all of your test results from your hospital stay or ER visit. Additional Information If you have questions, please visit the Frequently Asked Questions section of the [employer] website at [email or web address] Remember, [employer] is NOT to be used for urgent needs. For medical emergencies, dial 911. Now available from your iPhone and Android! Providers Seen During Your Hospitalization Provider Specialty Primary office phone Dina Chang MD Gastroenterology [phone] Your Primary Care Physician (PCP) Primary Care Physician Office Phone Office Fax Jaylan Sullivan [phone] You are allergic to the following Allergen Reactions Adhesive Tape-Silicones Other (comments) Some bandaids \"breaks skin out\" per wife. Recent Documentation Height Weight BMI Smoking Status 1.791 m 75.5 kg 23.54 kg/m2 Never Smoker Emergency Contacts Name Discharge Info Relation Home Work Mobile Marysol Strickland DISCHARGE CAREGIVER [3] Spouse [3]   [phone] Patient Belongings The following personal items are in your possession at time of discharge: 
  Dental Appliances: None  Visual Aid: None Please provide this summary of care documentation to your next provider. Signatures-by signing, you are acknowledging that this After Visit Summary has been reviewed with you and you have received a copy. Patient Signature:  ____________________________________________________________ Date:  ____________________________________________________________  
  
[facility] Provider Signature:  ____________________________________________________________ Date:  ____________________________________________________________

## 2018-02-02 NOTE — ANESTHESIA POSTPROCEDURE EVALUATION
Post-Anesthesia Evaluation and Assessment    Patient: Keyshawn Ryan Sr. MRN: [id number]  SSN: [id number]    YOB: 1945  Age: 67 y.o. Sex: male       Cardiovascular Function/Vital Signs  Visit Vitals    /65    Pulse 66    Temp 37.1 °C (98.8 °F)    Resp 17    Ht 5' 10.5\" (1.791 m)    Wt 75.5 kg (166 lb 7 oz)    SpO2 96%    BMI 23.54 kg/m2       Patient is status post MAC anesthesia for Procedure(s):  ENDOSCOPIC HALO ABLATION  ESOPHAGOGASTRODUODENOSCOPY (EGD). Nausea/Vomiting: None    Postoperative hydration reviewed and adequate. Pain:  Pain Scale 1: Adult Nonverbal Pain Scale (02/02/18 1125)  Pain Intensity 1: 0 (02/02/18 1125)   Managed    Neurological Status: At baseline    Mental Status and Level of Consciousness: Arousable    Pulmonary Status:   O2 Device: Room air (02/02/18 1125)   Adequate oxygenation and airway patent    Complications related to anesthesia: None    Post-anesthesia assessment completed.  No concerns    Signed By: Samara Ellis MD     February 2, 2018

## 2022-01-18 ENCOUNTER — HOSPITAL ENCOUNTER (EMERGENCY)
Age: 77
Discharge: HOME | End: 2022-01-18
Attending: EMERGENCY MEDICINE
Payer: MEDICARE

## 2022-01-18 VITALS
DIASTOLIC BLOOD PRESSURE: 63 MMHG | BODY MASS INDEX: 23.8 KG/M2 | HEIGHT: 70 IN | RESPIRATION RATE: 20 BRPM | TEMPERATURE: 97.5 F | OXYGEN SATURATION: 93 % | WEIGHT: 166.23 LBS | SYSTOLIC BLOOD PRESSURE: 162 MMHG | HEART RATE: 80 BPM

## 2022-01-18 DIAGNOSIS — J30.9 ALLERGIC RHINITIS, UNSPECIFIED SEASONALITY, UNSPECIFIED TRIGGER: ICD-10-CM

## 2022-01-18 DIAGNOSIS — I10 ACCELERATED HYPERTENSION: ICD-10-CM

## 2022-01-18 DIAGNOSIS — R04.0 EPISTAXIS: Primary | ICD-10-CM

## 2022-01-18 PROCEDURE — 30901 CONTROL OF NOSEBLEED: CPT

## 2022-01-18 PROCEDURE — 99281 EMR DPT VST MAYX REQ PHY/QHP: CPT

## 2022-01-18 RX ORDER — SILVER NITRATE 38.21; 12.74 MG/1; MG/1
1 STICK TOPICAL
Status: DISCONTINUED | OUTPATIENT
Start: 2022-01-18 | End: 2022-01-18 | Stop reason: HOSPADM

## 2022-01-18 RX ORDER — VITAMIN A 3000 MCG
2 CAPSULE ORAL AS NEEDED
Qty: 30 ML | Refills: 0 | Status: SHIPPED | OUTPATIENT
Start: 2022-01-18

## 2022-01-18 RX ORDER — VITAMIN A 3000 MCG
2 CAPSULE ORAL AS NEEDED
Qty: 30 ML | Refills: 0 | Status: CANCELLED | OUTPATIENT
Start: 2022-01-18

## 2022-01-18 RX ORDER — OXYMETAZOLINE HCL 0.05 %
2 SPRAY, NON-AEROSOL (ML) NASAL ONCE
Status: DISCONTINUED | OUTPATIENT
Start: 2022-01-18 | End: 2022-01-18 | Stop reason: HOSPADM

## 2022-01-18 RX ORDER — OXYMETAZOLINE HCL 0.05 %
2 SPRAY, NON-AEROSOL (ML) NASAL 2 TIMES DAILY
Qty: 1 EACH | Refills: 0 | Status: SHIPPED | OUTPATIENT
Start: 2022-01-18 | End: 2022-01-18 | Stop reason: SDUPTHER

## 2022-01-18 RX ORDER — OXYMETAZOLINE HCL 0.05 %
2 SPRAY, NON-AEROSOL (ML) NASAL 2 TIMES DAILY
Qty: 1 EACH | Refills: 0 | Status: SHIPPED | OUTPATIENT
Start: 2022-01-18 | End: 2022-01-21

## 2022-01-18 NOTE — DISCHARGE INSTRUCTIONS
Home Nosebleed Instructions:  1)  Apply nasal saline spray, x2 sprays in each nostril, every 2-3 hours and as needed. 2)  Apply afrin nasal spray, [id number] in each nostril, twice a day for 2-3 days (NO MORE THAN 3 DAYS). 3) Vaseline or bacitracin dab to both nostrils twice daily and as needed  4) NO nose blowing/wiping/digital manipulation, NO hot showers/soups, NO strenuous activity/strainting/bending over, sneeze with mouth open  5) If nosebleed recurs, first apply pressure to soft part of nose for at least 10 minutes, gargle ice water, administer Afrin, repeat. Thank You! It was a pleasure taking care of you in our Emergency Department today. We know that when you come to [facility], you are entrusting us with your health, comfort, and safety. Our physicians and nurses honor that trust, and truly appreciate the opportunity to care for you and your loved ones. We also value your feedback. If you receive a survey about your Emergency Department experience today, please fill it out. We care about our patients' feedback, and we listen to what you have to say. Thank you. Dr. Ye Esparza M.D.      ________________________________________________________________________  I have included a copy of your lab results and/or radiologic studies from today's visit so you can have them easily available at your follow-up visit. We hope you feel better and please do not hesitate to contact the ED if you have any questions at all! No results found for this or any previous visit (from the past 12 hour(s)). No orders to display     CT Results  (Last 48 hours)      None          The exam and treatment you received in the Emergency Department were for an urgent problem and are not intended as complete care. It is important that you follow up with a doctor, nurse practitioner, or physician assistant for ongoing care.  If your symptoms become worse or you do not improve as expected and you are unable to reach your usual health care provider, you should return to the Emergency Department. We are available 24 hours a day. Please take your discharge instructions with you when you go to your follow-up appointment. If a prescription has been provided, please have it filled as soon as possible to prevent a delay in treatment. Read the entire medication instruction sheet provided to you by the pharmacy. If you have any questions or reservations about taking the medication due to side effects or interactions with other medications, please call your primary care physician or contact the ER to speak with the charge nurse. Please make an appointment with your family doctor or the physician you were referred to for follow-up of this visit as instructed on your discharge paperwork. Return to the ER if you are unable to be seen or if you are unable to be seen in a timely manner. If you have any problem arranging the follow-up visit, contact the Emergency Department immediately.

## 2022-01-18 NOTE — ED PROVIDER NOTES
EMERGENCY DEPARTMENT HISTORY AND PHYSICAL EXAM      Please note that this dictation was completed with the assistance of \"Dragon\", the computer voice recognition software. Quite often unanticipated grammatical, syntax, homophones, and other interpretive errors are inadvertently transcribed by the computer software. Please disregard these errors and any errors that have escaped final proofreading. Thank you. Patient: Aston Means: 21  : 1945  MRN: [id number]  History of Presenting Illness     Chief Complaint   Patient presents with    Epistaxis     Patient ambulatory to triage w c/o nosebleed onset a few days ago this episode started at 2030. History Provided By: Patient/family/EMS (if applicable)    HPI: Reji Milligan ., 67 y.o. male with past medical history as documented below presents to the ED with c/o of intermittent epistaxis for the past several days. Pt had another episode just PTA but bleeding stopped on his way here. Pt reports no trauma. Denies aspirin use or anticoagulation use. Reports trying to apply pressure without significant relief. Denies prior hx of epistaxis. No hx of easy bleeding. Pt denies any other exacerbating or ameliorating factors. Additionally, pt specifically denies any recent fever, chills, headache, nausea, vomiting, abdominal pain, CP, SOB, lightheadedness, dizziness, numbness, weakness, lower extremity swelling, heart palpitations, urinary sxs, diarrhea, constipation, melena, hematochezia, cough, or congestion. There are no other complaints, changes or physical findings pertinent to the HPI at this time.     PCP: Florence Bolton MD  Past History   Past Medical History:  Past Medical History:   Diagnosis Date    Swan's esophagus     Gastrointestinal disorder     GERD (gastroesophageal reflux disease)     Ill-defined condition     lyme's disease       Past Surgical History:  Past Surgical History:   Procedure Laterality Date    HX HERNIA REPAIR  \"when I was 13\"       Family History:   Family history reviewed and was non-contributory, unless specified below:  Family History   Problem Relation Age of Onset    Cancer Father         leukemia       Social History:  Social History     Tobacco Use    Smoking status: Never Smoker    Smokeless tobacco: Never Used   Substance Use Topics    Alcohol use: No    Drug use: No       Allergies: Allergies   Allergen Reactions    Adhesive Tape-Silicones Other (comments)     Some bandaids \"breaks skin out\" per wife. Current Medications:  No current facility-administered medications on file prior to encounter. No current outpatient medications on file prior to encounter. Review of Systems   A complete ROS was reviewed by me today and all other systems negative, unless otherwise specified below:  Review of Systems   Constitutional: Negative. Negative for chills and fever. HENT: Positive for nosebleeds. Negative for congestion and sore throat. Eyes: Negative. Respiratory: Negative. Negative for cough, chest tightness, shortness of breath and wheezing. Cardiovascular: Negative. Negative for chest pain, palpitations and leg swelling. Gastrointestinal: Negative. Negative for abdominal distention, abdominal pain, blood in stool, constipation, diarrhea, nausea and vomiting. Endocrine: Negative. Genitourinary: Negative. Negative for dysuria, flank pain, frequency, hematuria and urgency. Musculoskeletal: Negative. Negative for arthralgias, back pain and myalgias. Skin: Negative. Negative for color change and rash. Neurological: Negative. Negative for dizziness, syncope, speech difficulty, weakness, light-headedness, numbness and headaches. Hematological: Negative. Psychiatric/Behavioral: Negative. Negative for confusion and self-injury. The patient is not nervous/anxious. All other systems reviewed and are negative.     Physical Exam   Physical Exam  Vitals and nursing note reviewed. Constitutional:       Appearance: He is well-developed. He is not toxic-appearing. HENT:      Head: Normocephalic and atraumatic. Comments: Dried blood left nares with plump vessel in anterior septum. No active bleeding. No blood in posterior pharynx. Mouth/Throat:      Pharynx: No posterior oropharyngeal erythema. Eyes:      Conjunctiva/sclera: Conjunctivae normal.   Cardiovascular:      Rate and Rhythm: Normal rate and regular rhythm. Heart sounds: Normal heart sounds. No murmur heard. No friction rub. No gallop. Pulmonary:      Effort: Pulmonary effort is normal. No respiratory distress. Breath sounds: Normal breath sounds. No wheezing or rales. Chest:      Chest wall: No tenderness. Abdominal:      General: Bowel sounds are normal. There is no distension. Palpations: Abdomen is soft. There is no mass. Tenderness: There is no abdominal tenderness. There is no guarding or rebound. Musculoskeletal:         General: Normal range of motion. Cervical back: Normal range of motion. Skin:     General: Skin is warm. Neurological:      General: No focal deficit present. Mental Status: He is alert and oriented to person, place, and time. Motor: No abnormal muscle tone. Psychiatric:         Behavior: Behavior is cooperative. Diagnostic Study Results     Laboratory Data  I have personally reviewed and interpreted all available laboratory results. No results found for this or any previous visit (from the past 24 hour(s)). Radiologic Studies   I have personally reviewed and interpreted all available imaging studies and agree with radiology interpretation. No orders to display     CT Results  (Last 48 hours)    None        CXR Results  (Last 48 hours)    None        Medical Decision Making   I am the first and primary ED physician for this patient's ED visit today.     I reviewed our electronic medical record system for any past medical records that may contribute to the patient's current condition, including their past medical history, surgical history, social and family history. This also includes their most recent ED visits, previous hospitalizations and prior diagnostic data. I have reviewed and summarized the most pertinent findings in my HPI and MDM. Vital Signs Reviewed:  Patient Vitals for the past 24 hrs:   Temp Pulse Resp BP SpO2   01/18/22 0048 97.5 °F (36.4 °C) 80 20 (!) 162/63 93 %     Pulse Oximetry Analysis: 93% on RA    Cardiac Monitor:   Rate: 80 bpm  The cardiac monitor revealed the following rhythm as interpreted by me: Normal Sinus Rhythm  Cardiac monitoring was ordered to monitor patient for signs of cardiac dysrhythmia, which they are at risk for based on their history and/or risk for cardiovascular disease and/or metabolic abnormalities. Records Reviewed: Nursing Notes, Old Medical Records, Previous electrocardiograms, Previous Radiology Studies and Previous Laboratory Studies, EMS reports    Provider Notes (Medical Decision Making):   Patient presents with epistaxis. Stable vitals but actively bleeding on exam. DDx: Due to elevated blood pressure, allergies, anticoagulation, drug use, trauma. Will apply compression with Afrin spray and monitor. If epistaxis continues, will provide further nosebleed management with topical medical management such as vasoconstrictors vs chemical cautery vs Rhinorocket placement. Once epistaxis is controlled in ED, will refer to ENT vs PCP for outpatient evaluation. Home Nosebleed Instructions:  1)  Apply nasal saline spray, x2 sprays in each nostril, every 2-3 hours and as needed. 2)  Apply afrin nasal spray, [id number] in each nostril, twice a day for 2-3 days (NO MORE THAN 3 DAYS).   3) Vaseline or bacitracin dab to both nostrils twice daily and as needed  4) NO nose blowing/wiping/digital manipulation, NO hot showers/soups, NO strenuous activity/strainting/bending over, sneeze with mouth open  5) If nosebleed recurs, first apply pressure to soft part of nose for at least 10 minutes, gargle ice water, administer Afrin, repeat.    =    ED Course:   Initial assessment performed. I discussed presenting problems and concerns, and my formulated plan for today's visit with the patient and any available family members. I have encouraged them to ask questions as they arise throughout the visit. Social History     Tobacco Use    Smoking status: Never Smoker    Smokeless tobacco: Never Used   Substance Use Topics    Alcohol use: No    Drug use: No       ED Orders Placed:  Orders Placed This Encounter    ENT CART TO ROOM    oxymetazoline (AFRIN) 0.05 % nasal spray 2 Spray    silver nitrate applicators (ARZOL) 1 Applicator    DISCONTD: oxymetazoline (Afrin, oxymetazoline,) 0.05 % nasal spray    sodium chloride (Saline NasaL) 0.65 % nasal squeeze bottle    oxymetazoline (Afrin, oxymetazoline,) 0.05 % nasal spray       ED Medications Administered During ED Course:  Medications   oxymetazoline (AFRIN) 0.05 % nasal spray 2 Spray (has no administration in time range)   silver nitrate applicators (ARZOL) 1 Applicator (has no administration in time range)         Procedure Note - Epistaxis Management:   3:58 AM  Performed by: Jose Stewart MD.  Complexity: simple  After administration of oxymetozline, the patient underwent silver nitrate chemical cautery applied to left nare(s). .  Hemostasis was achieved after placement. Estimated blood loss: none  The procedure took 1-15 minutes, and pt tolerated well. Progress Note:  I have just re-evaluated the patient. Patient reports improvement of sx's. I have reviewed His vital signs and determined there is currently no worsening in their condition or physical exam. Results have been reviewed with them and their questions have been answered. We will continue to review further results as they come available.      Progress Note:  Pt reassessed and symptoms noted to have improved significantly after ED treatment. Pt is clinically stable for discharge. Debra Leon Sr.'s labs and imaging have been reviewed with him and available family. He verbally conveys understanding and agreement of the signs, symptoms, diagnosis, treatment and prognosis and additionally agrees to follow up as recommended with Dr. Faustino Lozano MD and/or specialist as instructed. He agrees with the care plan we have created and conveys that all of his questions have been answered. Additionally, I have put together a packet of discharge instructions for him that include: 1) educational information regarding their diagnosis, 2) how to care for their diagnosis at home, as well a 3) list of reasons why they would want to return to the ED prior to their follow-up appointment should the patient's condition change or symptoms worsen. I have answered all questions to the patient's satisfaction. Strict return precautions given. He conveyed understanding and agreement with care plan. Vital signs stable for discharge. Disposition:  DISCHARGE  The pt is ready for discharge. The pt's signs, symptoms, diagnosis, and discharge instructions have been discussed and pt has conveyed their understanding. The pt is to follow up as recommended or return to ER should their symptoms worsen. Plan has been discussed and pt is in agreement. Plan:  1. Return precautions as discussed with patient and available family/caregiver. 2.   Discharge Medication List as of 1/18/2022  1:54 AM      START taking these medications    Details   sodium chloride (Saline NasaL) 0.65 % nasal squeeze bottle 0.1 mL by Both Nostrils route as needed for Congestion. , Print, Disp-30 mL, R-0      oxymetazoline (Afrin, oxymetazoline,) 0.05 % nasal spray 2 Sprays by Both Nostrils route two (2) times a day for 3 days. , Print, Disp-1 Each, R-0           3.    Follow-up Information     Follow up With Specialties Details Why Contact Info    [facility] EMERGENCY DEPT Emergency Medicine  As needed, If symptoms worsen [address] [facility]  [phone]    Jessica Reid MD Family Medicine  As needed, If symptoms worsen [address] [facility] [address]. [phone]      [facility]  As needed, If symptoms worsen [address] [facility]  [phone]        Instructed to return to ED if worse  Diagnosis   Clinical Impression:  1. Epistaxis    2. Allergic rhinitis, unspecified seasonality, unspecified trigger    3. Accelerated hypertension      Attestation:  Frederic De La Cruz MD, am the attending of record for this patient. I personally performed the services described in this documentation on this date, 1/18/2022 for patient, Savannah Enriqueta Sr. . I have reviewed the chart and verified that the record is accurate and complete.

## 2023-01-26 ENCOUNTER — TRANSCRIBE ORDER (OUTPATIENT)
Dept: SCHEDULING | Age: 78
End: 2023-01-26

## 2023-01-26 DIAGNOSIS — C61 MALIGNANT NEOPLASM OF PROSTATE (HCC): Primary | ICD-10-CM

## 2023-02-23 ENCOUNTER — HOSPITAL ENCOUNTER (OUTPATIENT)
Dept: PET IMAGING | Age: 78
Discharge: HOME | End: 2023-02-23
Attending: UROLOGY
Payer: MEDICARE

## 2023-02-23 VITALS — BODY MASS INDEX: 23.62 KG/M2 | WEIGHT: 165 LBS | HEIGHT: 70 IN

## 2023-02-23 DIAGNOSIS — C61 MALIGNANT NEOPLASM OF PROSTATE (HCC): ICD-10-CM

## 2023-02-23 PROCEDURE — 78815 PET IMAGE W/CT SKULL-THIGH: CPT

## 2023-05-20 RX ORDER — ECHINACEA PURPUREA EXTRACT 125 MG
2 TABLET ORAL PRN
COMMUNITY
Start: 2022-01-18

## 2025-03-15 ENCOUNTER — APPOINTMENT (OUTPATIENT)
Facility: HOSPITAL | Age: 80
End: 2025-03-15
Payer: MEDICARE

## 2025-03-15 ENCOUNTER — HOSPITAL ENCOUNTER (EMERGENCY)
Facility: HOSPITAL | Age: 80
Discharge: HOME | End: 2025-03-15
Payer: MEDICARE

## 2025-03-15 VITALS
BODY MASS INDEX: 25.28 KG/M2 | OXYGEN SATURATION: 97 % | HEART RATE: 54 BPM | RESPIRATION RATE: 18 BRPM | SYSTOLIC BLOOD PRESSURE: 179 MMHG | HEIGHT: 70 IN | TEMPERATURE: 98.1 F | WEIGHT: 176.59 LBS | DIASTOLIC BLOOD PRESSURE: 70 MMHG

## 2025-03-15 DIAGNOSIS — I10 HYPERTENSION, UNSPECIFIED TYPE: Primary | ICD-10-CM

## 2025-03-15 DIAGNOSIS — I99.9: ICD-10-CM

## 2025-03-15 DIAGNOSIS — R47.89 WORD FINDING DIFFICULTY: ICD-10-CM

## 2025-03-15 PROBLEM — G45.9 TIA (TRANSIENT ISCHEMIC ATTACK): Status: ACTIVE | Noted: 2025-03-15

## 2025-03-15 LAB
ALBUMIN SERPL-MCNC: 3.2 G/DL (ref 3.5–5)
ALBUMIN/GLOB SERPL: 0.9 (ref 1.1–2.2)
ALP SERPL-CCNC: 80 U/L (ref 45–117)
ALT SERPL-CCNC: 16 U/L (ref 12–78)
ANION GAP SERPL CALC-SCNC: 5 MMOL/L (ref 2–12)
AST SERPL-CCNC: 13 U/L (ref 15–37)
BASOPHILS # BLD: 0.04 K/UL (ref 0–0.1)
BASOPHILS NFR BLD: 0.5 % (ref 0–1)
BILIRUB SERPL-MCNC: 0.8 MG/DL (ref 0.2–1)
BUN SERPL-MCNC: 12 MG/DL (ref 6–20)
BUN/CREAT SERPL: 13 (ref 12–20)
CALCIUM SERPL-MCNC: 8.7 MG/DL (ref 8.5–10.1)
CHLORIDE SERPL-SCNC: 104 MMOL/L (ref 97–108)
CO2 SERPL-SCNC: 28 MMOL/L (ref 21–32)
CREAT SERPL-MCNC: 0.94 MG/DL (ref 0.7–1.3)
DIFFERENTIAL METHOD BLD: ABNORMAL
EOSINOPHIL # BLD: 0.29 K/UL (ref 0–0.4)
EOSINOPHIL NFR BLD: 3.5 % (ref 0–7)
ERYTHROCYTE [DISTWIDTH] IN BLOOD BY AUTOMATED COUNT: 12.8 % (ref 11.5–14.5)
GLOBULIN SER CALC-MCNC: 3.7 G/DL (ref 2–4)
GLUCOSE BLD STRIP.AUTO-MCNC: 98 MG/DL (ref 65–117)
GLUCOSE SERPL-MCNC: 105 MG/DL (ref 65–100)
HCT VFR BLD AUTO: 38.3 % (ref 36.6–50.3)
HGB BLD-MCNC: 13.6 G/DL (ref 12.1–17)
IMM GRANULOCYTES # BLD AUTO: 0.05 K/UL (ref 0–0.04)
IMM GRANULOCYTES NFR BLD AUTO: 0.6 % (ref 0–0.5)
INR PPP: 1.1 (ref 0.9–1.1)
LYMPHOCYTES # BLD: 2.3 K/UL (ref 0.8–3.5)
LYMPHOCYTES NFR BLD: 27.9 % (ref 12–49)
MCH RBC QN AUTO: 31.2 PG (ref 26–34)
MCHC RBC AUTO-ENTMCNC: 35.5 G/DL (ref 30–36.5)
MCV RBC AUTO: 87.8 FL (ref 80–99)
MONOCYTES # BLD: 0.75 K/UL (ref 0–1)
MONOCYTES NFR BLD: 9.1 % (ref 5–13)
NEUTS SEG # BLD: 4.8 K/UL (ref 1.8–8)
NEUTS SEG NFR BLD: 58.4 % (ref 32–75)
NRBC # BLD: 0 K/UL (ref 0–0.01)
NRBC BLD-RTO: 0 PER 100 WBC
PLATELET # BLD AUTO: 164 K/UL (ref 150–400)
PMV BLD AUTO: 9.6 FL (ref 8.9–12.9)
POTASSIUM SERPL-SCNC: 3.5 MMOL/L (ref 3.5–5.1)
PROT SERPL-MCNC: 6.9 G/DL (ref 6.4–8.2)
PROTHROMBIN TIME: 11.2 SEC (ref 9.2–11.2)
RBC # BLD AUTO: 4.36 M/UL (ref 4.1–5.7)
SERVICE CMNT-IMP: NORMAL
SODIUM SERPL-SCNC: 137 MMOL/L (ref 136–145)
TROPONIN I SERPL HS-MCNC: 8 NG/L (ref 0–76)
TROPONIN I SERPL HS-MCNC: 9 NG/L (ref 0–76)
WBC # BLD AUTO: 8.2 K/UL (ref 4.1–11.1)

## 2025-03-15 PROCEDURE — 80053 COMPREHEN METABOLIC PANEL: CPT

## 2025-03-15 PROCEDURE — 83036 HEMOGLOBIN GLYCOSYLATED A1C: CPT

## 2025-03-15 PROCEDURE — 82962 GLUCOSE BLOOD TEST: CPT

## 2025-03-15 PROCEDURE — 85025 COMPLETE CBC W/AUTO DIFF WBC: CPT

## 2025-03-15 PROCEDURE — 85610 PROTHROMBIN TIME: CPT

## 2025-03-15 PROCEDURE — 6370000000 HC RX 637 (ALT 250 FOR IP): Performed by: STUDENT IN AN ORGANIZED HEALTH CARE EDUCATION/TRAINING PROGRAM

## 2025-03-15 PROCEDURE — 70450 CT HEAD/BRAIN W/O DYE: CPT

## 2025-03-15 PROCEDURE — 99284 EMERGENCY DEPT VISIT MOD MDM: CPT

## 2025-03-15 PROCEDURE — 70551 MRI BRAIN STEM W/O DYE: CPT

## 2025-03-15 PROCEDURE — 84484 ASSAY OF TROPONIN QUANT: CPT

## 2025-03-15 PROCEDURE — 36415 COLL VENOUS BLD VENIPUNCTURE: CPT

## 2025-03-15 PROCEDURE — 93005 ELECTROCARDIOGRAM TRACING: CPT | Performed by: EMERGENCY MEDICINE

## 2025-03-15 PROCEDURE — 80061 LIPID PANEL: CPT

## 2025-03-15 RX ORDER — AMLODIPINE BESYLATE 5 MG/1
5 TABLET ORAL
Status: COMPLETED | OUTPATIENT
Start: 2025-03-15 | End: 2025-03-15

## 2025-03-15 RX ORDER — ASPIRIN 81 MG/1
81 TABLET ORAL DAILY
Qty: 90 TABLET | Refills: 0 | Status: SHIPPED | OUTPATIENT
Start: 2025-03-15

## 2025-03-15 RX ORDER — ROSUVASTATIN CALCIUM 20 MG/1
20 TABLET, COATED ORAL DAILY
Qty: 30 TABLET | Refills: 0 | Status: SHIPPED | OUTPATIENT
Start: 2025-03-15

## 2025-03-15 RX ORDER — AMLODIPINE BESYLATE 5 MG/1
5 TABLET ORAL DAILY
Qty: 30 TABLET | Refills: 0 | Status: SHIPPED | OUTPATIENT
Start: 2025-03-15

## 2025-03-15 RX ADMIN — AMLODIPINE BESYLATE 5 MG: 5 TABLET ORAL at 21:37

## 2025-03-15 ASSESSMENT — LIFESTYLE VARIABLES
HOW MANY STANDARD DRINKS CONTAINING ALCOHOL DO YOU HAVE ON A TYPICAL DAY: PATIENT DOES NOT DRINK
HOW OFTEN DO YOU HAVE A DRINK CONTAINING ALCOHOL: NEVER

## 2025-03-15 ASSESSMENT — PAIN - FUNCTIONAL ASSESSMENT: PAIN_FUNCTIONAL_ASSESSMENT: NONE - DENIES PAIN

## 2025-03-15 NOTE — ED NOTES
Report received from STACY Ochoa. Reviewed reason for patient arrival, vitals, labs, medications, orders, procedures, results, pending orders/results and current plan for disposition. Questions were asked and answered prior to departure.

## 2025-03-16 LAB
CHOLEST SERPL-MCNC: 155 MG/DL
EKG ATRIAL RATE: 54 BPM
EKG DIAGNOSIS: NORMAL
EKG P AXIS: 67 DEGREES
EKG P-R INTERVAL: 176 MS
EKG Q-T INTERVAL: 418 MS
EKG QRS DURATION: 94 MS
EKG QTC CALCULATION (BAZETT): 396 MS
EKG R AXIS: 42 DEGREES
EKG T AXIS: 63 DEGREES
EKG VENTRICULAR RATE: 54 BPM
EST. AVERAGE GLUCOSE BLD GHB EST-MCNC: 97 MG/DL
HBA1C MFR BLD: 5 % (ref 4–5.6)
HDLC SERPL-MCNC: 35 MG/DL
HDLC SERPL: 4.4 (ref 0–5)
LDLC SERPL CALC-MCNC: 102.4 MG/DL (ref 0–100)
TRIGL SERPL-MCNC: 88 MG/DL
VLDLC SERPL CALC-MCNC: 17.6 MG/DL

## 2025-03-16 NOTE — DISCHARGE SUMMARY
None      Follow up with:   PCP : Varinder Isaac MD Vota, Scott A, DO  [address] [address] [phone]    Schedule an appointment as soon as possible for a visit in 1 week(s)  For TIA evaluation and possible hypertensive vs amyloid angiopathy      Griffin Cedeno DO  [facility] [place]/ Nocturnist  3/15/2025 8:56 PM    Please do not hesitate to reach out to myself or assigned provider on-call via [email or web address] system with questions or concerns.     Total time in minutes spent coordinating this discharge (includes going over instructions, follow-up, prescriptions, and preparing report for sign off to her PCP) :  35 minutes

## 2025-03-16 NOTE — DISCHARGE INSTRUCTIONS
You were seen in the emergency department today for evaluation of transient aphasia (inability to speak) that has since resolved.  This is most consistent with a TIA (transient ischemic attack) otherwise known as a \"mini stroke\".  Your MRI did not show any evidence of acute injury.  There is note of potential hypertensive or amyloid angiopathy, which can be evaluated further with your primary care physician or neurology.  You will be discharged with prescriptions for 81 mg daily aspirin, 5 mg daily amlodipine (blood pressure lowering medication), and 20 mg daily rosuvastatin (cholesterol-lowering medication).  These medications do require monitoring and adjustment and you need to follow-up with your primary care within 1 week for further evaluation and management.  Please monitor and record your blood pressure daily for your physician to review.  If you experience lightheadedness or blood pressure readings less than 100 systolic (top number) please stop the amlodipine until discussing with your primary care further. I have attached information regarding transient ischemic attacks for you to review.  Should you experience recurrent, new/worsening, or any other concerning symptoms please do not hesitate to return to the emergency department for further evaluation.    Griffin Cedeno DO  [facility] [place] Hospitalist/ Nocturnist  3/15/2025 8:48 PM

## 2025-03-16 NOTE — ED NOTES
Discharge instructions given to pt. Saline lock removed. Discharged ambulatory, gait steady. Declines wheelchair. Accompanied by female . No acute distress at time of departure.

## 2025-03-16 NOTE — ED PROVIDER NOTES
service, Dr Cedeno for admission           FINAL IMPRESSION     1. Hypertension, unspecified type    2. Angiopathy    3. Word finding difficulty          DISPOSITION/PLAN   DISPOSITION Decision To Admit 03/15/2025 08:28:29 PM   DISPOSITION CONDITION Stable           Admit Note: Pt is being admitted by hospitalist team. The results of their tests and reason(s) for their admission have been discussed with pt and/or available family. They convey agreement and understanding for the need to be admitted and for the admission diagnosis.     PATIENT REFERRED TO:  No follow-up provider specified.     DISCHARGE MEDICATIONS:     Medication List        ASK your doctor about these medications      sodium chloride 0.65 % nasal spray  Commonly known as: OCEAN                DISCONTINUED MEDICATIONS:  Current Discharge Medication List          I have seen and evaluated the patient autonomously. My supervision physician was on site and available for consultation if needed.     I am the Primary Clinician of Record.   Fátima Troy PA-C (electronically signed)    (Please note that parts of this dictation were completed with voice recognition software. Quite often unanticipated grammatical, syntax, homophones, and other interpretive errors are inadvertently transcribed by the computer software. Please disregards these errors. Please excuse any errors that have escaped final proofreading.)         Fátima Troy PA-C  03/15/25 2052

## (undated) DEVICE — SOLIDIFIER FLUID 3000 CC ABSORB

## (undated) DEVICE — ENDO CARRY-ON PROCEDURE KIT INCLUDES ENZYMATIC SPONGE, GAUZE, BIOHAZARD LABEL, TRAY, LUBRICANT, DIRTY SCOPE LABEL, WATER LABEL, TRAY, DRAWSTRING PAD, AND DEFENDO 4-PIECE KIT.: Brand: ENDO CARRY-ON PROCEDURE KIT

## (undated) DEVICE — 1200 GUARD II KIT W/5MM TUBE W/O VAC TUBE: Brand: GUARDIAN

## (undated) DEVICE — 360 EXPRESS RFA BALLOON CATHETER: Brand: BARRX

## (undated) DEVICE — BASIN EMESIS 500CC ROSE 250/CS 60/PLT: Brand: MEDEGEN MEDICAL PRODUCTS, LLC

## (undated) DEVICE — SYR 3ML LL TIP 1/10ML GRAD --

## (undated) DEVICE — CAP CLN SM SFT DISP BARRX

## (undated) DEVICE — SET EXTN TBNG L BOR 4 W STPCOCK ST 32IN PRIMING VOL 6ML

## (undated) DEVICE — KENDALL RADIOLUCENT FOAM MONITORING ELECTRODE -RECTANGULAR SHAPE: Brand: KENDALL

## (undated) DEVICE — BAG BELONG PT PERS CLEAR HANDL

## (undated) DEVICE — KENDALL RADIOLUCENT FOAM MONITORING ELECTRODE RECTANGULAR SHAPE: Brand: KENDALL

## (undated) DEVICE — Z DISCONTINUED NO SUB IDED SET EXTN W/ 4 W STPCOCK M SPIN LOK 36IN

## (undated) DEVICE — SYRINGE MED 20ML STD CLR PLAS LUERLOCK TIP N CTRL DISP

## (undated) DEVICE — MEDI-VAC YANK SUCT HNDL W/TPRD BULBOUS TIP: Brand: CARDINAL HEALTH

## (undated) DEVICE — SET ADMIN 16ML TBNG L100IN 2 Y INJ SITE IV PIGGY BK DISP

## (undated) DEVICE — CANN NASAL O2 CAPNOGRAPHY AD -- FILTERLINE

## (undated) DEVICE — Z DISCONTINUED PER MEDLINE LINE GAS SAMPLING O2/CO2 LNG AD 13 FT NSL W/ TBNG FILTERLINE

## (undated) DEVICE — (D)SYR 10ML 1/5ML GRAD NSAF -- PKGING CHANGE USE ITEM 338027

## (undated) DEVICE — SOLIDIFIER MEDC 1200ML -- CONVERT TO 356117

## (undated) DEVICE — BW-412T DISP COMBO CLEANING BRUSH: Brand: SINGLE USE COMBINATION CLEANING BRUSH

## (undated) DEVICE — Device

## (undated) DEVICE — CATHETER ENDOSCP L135CM W7.5XL15.7MM DIA2.8MM FLX RFA

## (undated) DEVICE — BLOCK BITE ENDOSCP AD 21 MM W/ DIL BLU LF DISP

## (undated) DEVICE — BAG SPEC BIOHZRD 10 X 10 IN --

## (undated) DEVICE — KIT IV STRT W CHLORAPREP PD 1ML

## (undated) DEVICE — CATH IV AUTOGRD BC PNK 20GA 25 -- INSYTE

## (undated) DEVICE — NEEDLE HYPO 18GA L1.5IN PNK S STL HUB POLYPR SHLD REG BVL

## (undated) DEVICE — Device: Brand: MEDEX

## (undated) DEVICE — CATH IV AUTOGRD BC BLU 22GA 25 -- INSYTE

## (undated) DEVICE — FORCEPS BX L160CM DIA8MM GRSP DISECT CUP TIP NONLOCKING ROT

## (undated) DEVICE — BITE BLK ENDOSCP AD 54FR GRN POLYETH ENDOSCP W STRP SLD

## (undated) DEVICE — CONTAINER SPEC 20 ML LID NEUT BUFF FORMALIN 10 % POLYPR STS

## (undated) DEVICE — TOWEL 4 PLY TISS 19X30 SUE WHT